# Patient Record
Sex: MALE | Race: WHITE | NOT HISPANIC OR LATINO | Employment: FULL TIME | URBAN - METROPOLITAN AREA
[De-identification: names, ages, dates, MRNs, and addresses within clinical notes are randomized per-mention and may not be internally consistent; named-entity substitution may affect disease eponyms.]

---

## 2017-01-03 ENCOUNTER — GENERIC CONVERSION - ENCOUNTER (OUTPATIENT)
Dept: OTHER | Facility: OTHER | Age: 32
End: 2017-01-03

## 2017-02-21 ENCOUNTER — GENERIC CONVERSION - ENCOUNTER (OUTPATIENT)
Dept: OTHER | Facility: OTHER | Age: 32
End: 2017-02-21

## 2017-06-06 ENCOUNTER — GENERIC CONVERSION - ENCOUNTER (OUTPATIENT)
Dept: OTHER | Facility: OTHER | Age: 32
End: 2017-06-06

## 2017-12-27 ENCOUNTER — ALLSCRIPTS OFFICE VISIT (OUTPATIENT)
Dept: OTHER | Facility: OTHER | Age: 32
End: 2017-12-27

## 2017-12-28 NOTE — PROGRESS NOTES
Assessment   1  Acute otitis media, right (382 9) (H66 91)   2  Acute sinusitis (461 9) (J01 90)    Plan   Acute otitis media, right, Acute sinusitis, recurrence not specified, unspecified location    · Azithromycin 250 MG Oral Tablet; TAKE 2 TABLETS ON DAY 1 THEN TAKE 1    TABLET A DAY FOR 4 DAYS    Discussion/Summary      1  Right acute otitis media-recommend Zithromax Z-Jaskaran as directed  Patient also use supportive care with over-the-counter Advil as necessary  Follow-up in the next 4-5 days if symptoms are not improving  Acute sinusitis-treatment as above  Patient may also continue DayQuil as necessary  Chief Complaint   C/o- Fever last night of 101, headache,sore throat and right ear paint x 1 day  Pt states he took advil for pain felt some relief  declines the flu vaccine today  [facility] [place]      History of Present Illness   HPI: This is a 42-year-old gentleman that presents to the office with concerns over head congestion, sore throat, and right ear pain for the past few days  He has been feeling fatigued and run down  He has been taking some over-the-counter Advil with little relief  He has been around multiple sick contacts  Review of Systems        Constitutional: no fever,-- not feeling poorly-- and-- not feeling tired  ENT: no nosebleeds-- and-- no nasal discharge  Cardiovascular: no chest pain-- and-- no palpitations  Respiratory: no cough-- and-- no shortness of breath during exertion  Gastrointestinal: no nausea-- and-- no diarrhea  Musculoskeletal: no arthralgias-- and-- no myalgias  Neurological: no headache  Over the past 2 weeks, how often have you been bothered by the following problems? 1 ) Little interest or pleasure in doing things? Not at all       2 ) Feeling down, depressed or hopeless? Not at all       3 ) Trouble falling asleep or sleeping too much? Not at all       4 ) Feeling tired or having little energy?  Not at all       5 ) Poor appetite or overeating? Not at all       6 ) Feeling bad about yourself, or that you are a failure, or have let yourself or your family down? Not at all       7 ) Trouble concentrating on things, such as reading a newspaper or watching television? Not at all       8 ) Moving or speaking so slowly that other people could have noticed, or the opposite, moving or speaking faster than usual? Not at all  How difficult have these problems made it for you to do your work, take care of things at home, or get along with people? Not at all  Active Problems   1  ACL tear (844 2) (S83 519A)   2  Allergic rhinitis (477 9) (J30 9)   3  Chronic meniscal tear of knee, left (717 5) (M23 207)   4  Eustachian tube dysfunction (381 81) (H69 80)   5  Headache (784 0) (R51)   6  History of repair of anterior cruciate ligament of left knee (V45 89) (Z98 890)   7  Left anterior cruciate ligament tear (844 2) (S83 512A)   8  Left knee injury (959 7) (S89 92XA)   9  Left knee pain (719 46) (M25 562)   10  Paronychia of toe (681 11) (L03 039)   11  Seasickness (994 6) (T75 3XXA)   12  Tinea corporis (110 5) (B35 4)    Past Medical History   1  History of acute otitis media (V12 49) (Z86 69)  Active Problems And Past Medical History Reviewed: The active problems and past medical history were reviewed and updated today  Family History   Mother    1  No pertinent family history  Father    2  No pertinent family history    Social History    ·    · Never a smoker   · No alcohol use   · No drug use   · Occupation   ·     Surgical History   1  History of Primary Repair Of Knee Ligament Cruciate Anterior    Current Meds      The medication list was reviewed and updated today  Allergies   1   No Known Drug Allergies    Vitals    Recorded: [id number] 11:02AM   Temperature 97 4 F, Tympanic   Heart Rate 72, L Radial   Pulse Quality Normal, L Radial   Respiration Quality Normal   Respiration 16   Systolic 295, LUE, Sitting   Diastolic 76, LUE, Sitting   Height 5 ft 11 in   Weight 178 lb 3 2 oz   BMI Calculated 24 85   BSA Calculated 2 01     Physical Exam        Constitutional      General appearance: No acute distress, well appearing and well nourished  Eyes      Conjunctiva and lids: No swelling, erythema, or discharge  Pupils and irises: Equal, round and reactive to light  Ears, Nose, Mouth, and Throat      External inspection of ears and nose: Normal        Otoscopic examination: Abnormal  -- Right tympanic membrane is dull with clear fluid effusion  Nasal mucosa, septum, and turbinates: Abnormal  -- Turbinates are erythematous and edematous bilaterally  Oropharynx: Normal with no erythema, edema, exudate or lesions  Pulmonary      Respiratory effort: No increased work of breathing or signs of respiratory distress  Auscultation of lungs: Clear to auscultation, equal breath sounds bilaterally, no wheezes, no rales, no rhonci  Cardiovascular      Auscultation of heart: Normal rate and rhythm, normal S1 and S2, without murmurs  Examination of extremities for edema and/or varicosities: Normal        Abdomen      Abdomen: Non-tender, no masses  Liver and spleen: No hepatomegaly or splenomegaly  Lymphatic      Palpation of lymph nodes in neck: No lymphadenopathy  Musculoskeletal      Gait and station: Normal        Digits and nails: Normal without clubbing or cyanosis  Inspection/palpation of joints, bones, and muscles: Normal        Neurologic      Reflexes: 2+ and symmetric  Sensation: No sensory loss         Psychiatric      Orientation to person, place and time: Normal        Mood and affect: Normal           Signatures    Electronically signed by : Bal España, [facility]; Dec 27 2017 11:16AM EST                       (Author)     Electronically signed by : You Peoples DO; Dec 27 2017 12:30PM EST                       (Author)

## 2018-01-15 NOTE — RESULT NOTES
Message   I did call pt and left voicemail to call back    pt has ACL tear and medial meniscus tear as well    he need ot see ortho    I believe he has an appointment already      Verified Results  * MRI KNEE LEFT  WO CONTRAST [id number] 06:51AM Anabel Kennedy   TW Order Number: [id number]   Performing Comments: HX of torn ACL, internal derangement, rule out liganment injury   [facility] AUTH#  [id number], VALID  11-  /  12-   - Patient Instructions: To schedule this appointment, please contact Central Scheduling at [phone]  Test Name Result Flag Reference   MRI KNEE LEFT  WO CONTRAST (Report)     MRI LEFT KNEE     INDICATION: Pt was jumping on a trampoline 11/11/16 when left knee went out and he felt instant pain  C/O left knee pain, weakness and unstable  COMPARISON: Left knee MR from 10/21/2015  TECHNIQUE:  MR sequences were obtained of the left knee including: Localizer, axial T2 fat sat, coronal T1/T2 fat sat, sagittal PD/T2 fat sat  Images were acquired on a 1 5 Jenny unit  Gadolinium was not used  FINDINGS:     SUBCUTANEOUS TISSUES: Normal     JOINT EFFUSION: None  BAKER'S CYST: None  MENISCI: There is a horizontal undersurface tear of the medial meniscus at the junction of the body and posterior horn, with flipped meniscal fragment into the inferior aspect of the medial gutter (series 3 images 17 through 19 and series 6 images 8    through 10 )     Lateral meniscus is diminutive, consistent with prior partial meniscectomy  CRUCIATE LIGAMENTS: Compared to 10/21/2015, there has been a revision anterior cruciate ligament repair  There is recurrent high grade partial versus complete tear of the ACL graft, as evidenced by angulation in the mid substance of the (series 3 images   11 through 14 ) The posterior cruciate ligament is intact  EXTENSOR APPARATUS: Intact  COLLATERAL LIGAMENTS: Intact  ARTICULAR SURFACES: Intact       BONE MARROW: There is marrow edema in the anterior aspect of the lateral femoral condyle and posterior aspect of the lateral tibial plateau, consistent with bone contusions from pivot shift mechanism of ACL tear  MUSCULATURE: Intact  IMPRESSION:     Compared to 10/21/2015, there has been a revision anterior cruciate ligament repair   There is recurrent high grade partial versus complete tear of the ACL graft (series 3 images 11 through 14 )      There is a horizontal undersurface tear of the medial meniscus at the junction of the body and posterior horn, with flipped meniscal fragment into the inferior aspect of the medial gutter (series 3 images 17 through 19 and series 6 images 8 through 10 )       Workstation performed: [id number]     Signed by:   Dario Atwood MD   11/28/16

## 2018-01-22 VITALS
SYSTOLIC BLOOD PRESSURE: 122 MMHG | BODY MASS INDEX: 24.95 KG/M2 | DIASTOLIC BLOOD PRESSURE: 76 MMHG | HEIGHT: 71 IN | TEMPERATURE: 97.4 F | RESPIRATION RATE: 16 BRPM | HEART RATE: 72 BPM | WEIGHT: 178.2 LBS

## 2018-01-23 NOTE — MISCELLANEOUS
Message  Return to work or school:   Gin Garcia is under my professional care  He was seen in my office on 12/27/17       Please excuse from work 12/28/17  MEGAN PERSON PA-C  Signatures   Electronically signed by :  Buzz Marquis, ; Dec 27 2017 11:13AM EST                       (Author)

## 2018-03-08 ENCOUNTER — OFFICE VISIT (OUTPATIENT)
Dept: FAMILY MEDICINE CLINIC | Facility: CLINIC | Age: 33
End: 2018-03-08
Payer: COMMERCIAL

## 2018-03-08 VITALS
DIASTOLIC BLOOD PRESSURE: 60 MMHG | SYSTOLIC BLOOD PRESSURE: 102 MMHG | TEMPERATURE: 98 F | BODY MASS INDEX: 24.8 KG/M2 | WEIGHT: 177.8 LBS | HEART RATE: 84 BPM

## 2018-03-08 DIAGNOSIS — J06.9 VIRAL URI: Primary | ICD-10-CM

## 2018-03-08 PROCEDURE — 1036F TOBACCO NON-USER: CPT | Performed by: PHYSICIAN ASSISTANT

## 2018-03-08 PROCEDURE — 99213 OFFICE O/P EST LOW 20 MIN: CPT | Performed by: PHYSICIAN ASSISTANT

## 2018-03-08 NOTE — PATIENT INSTRUCTIONS
Problem List Items Addressed This Visit     Viral URI - Primary     Symptomatic treatment with increase in fluids and continuation of Mucinex  Numbing lozenges

## 2018-03-08 NOTE — PROGRESS NOTES
Assessment/Plan:    Viral URI  Symptomatic treatment with increase in fluids and continuation of Mucinex  Numbing lozenges  Diagnoses and all orders for this visit:    Viral URI          Subjective:   CC: c/o sore throat, ears pooping and some sinus pressure x 1 day  felipe     Patient ID: Tayo Leonardo is a 28 y o  male  Symptoms of ST since yesterday  Mucinex this am  No fevers  No N/V/D  Head congestion, ST, PND of L side  Patient did have strep in the past has tonsils not has not had a problem with this since then  He wanted to make sure in today to make sure he did not need an antibiotic at this time  The following portions of the patient's history were reviewed and updated as appropriate: allergies, current medications, past family history, past medical history, past social history, past surgical history and problem list     Review of Systems   Constitutional: Negative  HENT: Positive for postnasal drip and sore throat  Eyes: Negative  Respiratory: Negative  Cardiovascular: Negative  Gastrointestinal: Negative  Endocrine: Negative  Genitourinary: Negative  Musculoskeletal: Negative  Skin: Negative  Allergic/Immunologic: Negative  Neurological: Negative  Hematological: Negative  Psychiatric/Behavioral: Negative  Objective:      Vitals:    03/08/18 1425   BP: 102/60   BP Location: Left arm   Patient Position: Sitting   Pulse: 84   Temp: 98 °F (36 7 °C)   TempSrc: Tympanic   Weight: 80 6 kg (177 lb 12 8 oz)            Physical Exam   Constitutional: He is oriented to person, place, and time  He appears well-developed and well-nourished  No distress  HENT:   Head: Normocephalic and atraumatic  Tonsils absent mild to moderate posterior pharynx erythema without exudate  Eyes: Conjunctivae are normal  Right eye exhibits no discharge  Left eye exhibits no discharge  Neck: Carotid bruit is not present     Cardiovascular: Normal rate, regular rhythm and normal heart sounds  Exam reveals no gallop and no friction rub  No murmur heard  Pulmonary/Chest: Effort normal and breath sounds normal  No respiratory distress  He has no wheezes  He has no rales  Neurological: He is alert and oriented to person, place, and time  Skin: Skin is warm and dry  He is not diaphoretic  Psychiatric: He has a normal mood and affect  Judgment normal    Nursing note and vitals reviewed

## 2018-12-20 ENCOUNTER — CLINICAL SUPPORT (OUTPATIENT)
Dept: FAMILY MEDICINE CLINIC | Facility: CLINIC | Age: 33
End: 2018-12-20
Payer: COMMERCIAL

## 2018-12-20 DIAGNOSIS — Z23 NEED FOR TDAP VACCINATION: ICD-10-CM

## 2018-12-20 DIAGNOSIS — Z23 NEED FOR INFLUENZA VACCINATION: Primary | ICD-10-CM

## 2018-12-20 PROCEDURE — 90471 IMMUNIZATION ADMIN: CPT

## 2018-12-20 PROCEDURE — 90472 IMMUNIZATION ADMIN EACH ADD: CPT

## 2018-12-20 PROCEDURE — 90686 IIV4 VACC NO PRSV 0.5 ML IM: CPT

## 2018-12-20 PROCEDURE — 90715 TDAP VACCINE 7 YRS/> IM: CPT

## 2018-12-20 NOTE — PROGRESS NOTES
Pt requesting pertussis vaccine due to wife having a baby next month  Administered Adacel 0 5ml IM to (R) deltoid  Also administered flu vaccine (Quad) to (L) delt  Tolerated both injections without difficulty and left the office in no distress  --bb

## 2019-01-08 ENCOUNTER — OFFICE VISIT (OUTPATIENT)
Dept: FAMILY MEDICINE CLINIC | Facility: CLINIC | Age: 34
End: 2019-01-08
Payer: COMMERCIAL

## 2019-01-08 VITALS
BODY MASS INDEX: 24.71 KG/M2 | DIASTOLIC BLOOD PRESSURE: 68 MMHG | WEIGHT: 182.4 LBS | HEART RATE: 72 BPM | SYSTOLIC BLOOD PRESSURE: 116 MMHG | HEIGHT: 72 IN

## 2019-01-08 DIAGNOSIS — S33.5XXA LUMBAR SPRAIN, INITIAL ENCOUNTER: Primary | ICD-10-CM

## 2019-01-08 PROBLEM — J06.9 VIRAL URI: Status: RESOLVED | Noted: 2018-03-08 | Resolved: 2019-01-08

## 2019-01-08 PROCEDURE — 1036F TOBACCO NON-USER: CPT | Performed by: PHYSICIAN ASSISTANT

## 2019-01-08 PROCEDURE — 99214 OFFICE O/P EST MOD 30 MIN: CPT | Performed by: PHYSICIAN ASSISTANT

## 2019-01-08 PROCEDURE — 3008F BODY MASS INDEX DOCD: CPT | Performed by: PHYSICIAN ASSISTANT

## 2019-01-08 RX ORDER — METHOCARBAMOL 750 MG/1
750 TABLET, FILM COATED ORAL EVERY 8 HOURS SCHEDULED
Qty: 30 TABLET | Refills: 1 | Status: SHIPPED | OUTPATIENT
Start: 2019-01-08 | End: 2019-05-02

## 2019-01-08 RX ORDER — NABUMETONE 750 MG/1
750 TABLET, FILM COATED ORAL 2 TIMES DAILY
Qty: 30 TABLET | Refills: 1 | Status: SHIPPED | OUTPATIENT
Start: 2019-01-08 | End: 2019-05-02

## 2019-01-08 NOTE — LETTER
January 8, 2019     Patient: Veronica Gonzalez   YOB: 1985   Date of Visit: 1/8/2019       To Whom it May Concern:    Theo Plunkett is under my professional care  He was seen in my office on 1/8/2019 and diagnosed with acute L lumbar back sprain  He should be allowed to work from home as tolerated to have access to heat and ice and medications treatments  If you have any questions or concerns, please don't hesitate to call           Sincerely,          Rodrigue Canada PA-C        CC: No Recipients

## 2019-01-08 NOTE — ASSESSMENT & PLAN NOTE
Relafen 750 mg twice daily with meals and Robaxin 750 mg up to three times a day  Heat and Ice alternating

## 2019-01-08 NOTE — PATIENT INSTRUCTIONS
Problem List Items Addressed This Visit        Musculoskeletal and Integument    Lumbar back sprain - Primary     Relafen 750 mg twice daily with meals and Robaxin 750 mg up to three times a day  Heat and Ice alternating            Relevant Medications    methocarbamol (ROBAXIN) 750 mg tablet    nabumetone (RELAFEN) 750 mg tablet

## 2019-01-08 NOTE — PROGRESS NOTES
Assessment/Plan:    Lumbar back sprain  Relafen 750 mg twice daily with meals and Robaxin 750 mg up to three times a day  Heat and Ice alternating  Diagnoses and all orders for this visit:    Lumbar sprain, initial encounter  -     methocarbamol (ROBAXIN) 750 mg tablet; Take 1 tablet (750 mg total) by mouth every 8 (eight) hours for 14 days  -     nabumetone (RELAFEN) 750 mg tablet; Take 1 tablet (750 mg total) by mouth 2 (two) times a day for 14 days          Subjective: c/o back pain  Pain is located on the lower (L) side  Onset 1/6/19 when pt was lifting about 100 lbs  Using tylenol and ASA with minimal relief  --bb     Patient ID: Jennifer Juarez is a 35 y o  male  Sunday am lifting 100 pound object in brothers garage and immediately felt discomfort  Having consistent pain and had to lye for a full 24 hours afterward  Taking ASA and tylenol w/o help  Worse on L only  No L leg pain or weakness  No butt pain  The following portions of the patient's history were reviewed and updated as appropriate: allergies, current medications, past family history, past medical history, past social history, past surgical history and problem list     Review of Systems   Constitutional: Negative  HENT: Negative  Eyes: Negative  Respiratory: Negative  Cardiovascular: Negative  Gastrointestinal: Negative  Endocrine: Negative  Genitourinary: Negative  Musculoskeletal: Positive for back pain  Skin: Negative  Allergic/Immunologic: Negative  Neurological: Negative  Hematological: Negative  Psychiatric/Behavioral: Negative  Objective:      /68 (BP Location: Left arm, Patient Position: Sitting, Cuff Size: Standard)   Pulse 72   Ht 6' (1 829 m)   Wt 82 7 kg (182 lb 6 4 oz)   BMI 24 74 kg/m²          Physical Exam   Constitutional: He is oriented to person, place, and time  He appears well-developed and well-nourished  No distress     HENT:   Head: Normocephalic and atraumatic  Eyes: Conjunctivae are normal  Right eye exhibits no discharge  Left eye exhibits no discharge  Neck: Carotid bruit is not present  Cardiovascular: Normal rate  Pulmonary/Chest: Effort normal  No respiratory distress  Musculoskeletal:        Arms:  Left lumbar paravertebral musculature with obvious edema tenderness swelling and firmness  Negative sciatic notch tenderness mild spine discomfort without evidence of disc disruption  Deep tendon reflexes within normal limits and equal    Neurological: He is alert and oriented to person, place, and time  He has normal strength and normal reflexes  Skin: Skin is warm and dry  He is not diaphoretic  Psychiatric: He has a normal mood and affect  Judgment normal    Nursing note and vitals reviewed

## 2019-05-02 ENCOUNTER — OFFICE VISIT (OUTPATIENT)
Dept: FAMILY MEDICINE CLINIC | Facility: CLINIC | Age: 34
End: 2019-05-02
Payer: COMMERCIAL

## 2019-05-02 VITALS
HEIGHT: 72 IN | WEIGHT: 176.4 LBS | DIASTOLIC BLOOD PRESSURE: 60 MMHG | HEART RATE: 80 BPM | BODY MASS INDEX: 23.89 KG/M2 | SYSTOLIC BLOOD PRESSURE: 122 MMHG

## 2019-05-02 DIAGNOSIS — J30.9 ALLERGIC RHINITIS, UNSPECIFIED SEASONALITY, UNSPECIFIED TRIGGER: Primary | ICD-10-CM

## 2019-05-02 PROBLEM — S83.509A SPRAIN OF CRUCIATE LIGAMENT OF KNEE: Status: ACTIVE | Noted: 2019-05-02

## 2019-05-02 PROCEDURE — 1036F TOBACCO NON-USER: CPT | Performed by: PHYSICIAN ASSISTANT

## 2019-05-02 PROCEDURE — 99214 OFFICE O/P EST MOD 30 MIN: CPT | Performed by: PHYSICIAN ASSISTANT

## 2019-05-02 RX ORDER — CETIRIZINE HYDROCHLORIDE 10 MG/1
10 TABLET ORAL DAILY
COMMUNITY
End: 2019-12-17

## 2019-05-02 RX ORDER — AZELASTINE 1 MG/ML
SPRAY, METERED NASAL
Qty: 1 BOTTLE | Refills: 1 | Status: SHIPPED | OUTPATIENT
Start: 2019-05-02 | End: 2019-12-17

## 2019-05-30 ENCOUNTER — OFFICE VISIT (OUTPATIENT)
Dept: FAMILY MEDICINE CLINIC | Facility: CLINIC | Age: 34
End: 2019-05-30
Payer: COMMERCIAL

## 2019-05-30 VITALS
SYSTOLIC BLOOD PRESSURE: 110 MMHG | HEIGHT: 72 IN | WEIGHT: 179 LBS | BODY MASS INDEX: 24.24 KG/M2 | TEMPERATURE: 100.9 F | HEART RATE: 112 BPM | DIASTOLIC BLOOD PRESSURE: 70 MMHG

## 2019-05-30 DIAGNOSIS — B34.9 VIRAL ILLNESS: Primary | ICD-10-CM

## 2019-05-30 DIAGNOSIS — R50.9 FEVER, UNSPECIFIED FEVER CAUSE: ICD-10-CM

## 2019-05-30 LAB
SL AMB  POCT GLUCOSE, UA: NEGATIVE
SL AMB LEUKOCYTE ESTERASE,UA: NEGATIVE
SL AMB POCT BILIRUBIN,UA: NEGATIVE
SL AMB POCT BLOOD,UA: NEGATIVE
SL AMB POCT CLARITY,UA: CLEAR
SL AMB POCT COLOR,UA: YELLOW
SL AMB POCT KETONES,UA: NEGATIVE
SL AMB POCT NITRITE,UA: NEGATIVE
SL AMB POCT PH,UA: 7
SL AMB POCT SPECIFIC GRAVITY,UA: 1.01
SL AMB POCT URINE PROTEIN: NEGATIVE
SL AMB POCT UROBILINOGEN: 0.2

## 2019-05-30 PROCEDURE — 3008F BODY MASS INDEX DOCD: CPT | Performed by: PHYSICIAN ASSISTANT

## 2019-05-30 PROCEDURE — 99213 OFFICE O/P EST LOW 20 MIN: CPT | Performed by: PHYSICIAN ASSISTANT

## 2019-05-30 PROCEDURE — 81003 URINALYSIS AUTO W/O SCOPE: CPT | Performed by: PHYSICIAN ASSISTANT

## 2019-09-14 ENCOUNTER — OFFICE VISIT (OUTPATIENT)
Dept: FAMILY MEDICINE CLINIC | Facility: CLINIC | Age: 34
End: 2019-09-14
Payer: COMMERCIAL

## 2019-09-14 VITALS
HEART RATE: 100 BPM | SYSTOLIC BLOOD PRESSURE: 108 MMHG | BODY MASS INDEX: 25.9 KG/M2 | TEMPERATURE: 100.3 F | HEIGHT: 72 IN | WEIGHT: 191.2 LBS | DIASTOLIC BLOOD PRESSURE: 74 MMHG

## 2019-09-14 DIAGNOSIS — B34.9 VIRAL ILLNESS: Primary | ICD-10-CM

## 2019-09-14 PROCEDURE — 99213 OFFICE O/P EST LOW 20 MIN: CPT | Performed by: NURSE PRACTITIONER

## 2019-09-14 PROCEDURE — 3008F BODY MASS INDEX DOCD: CPT | Performed by: NURSE PRACTITIONER

## 2019-09-14 RX ORDER — GUAIFENESIN, PSEUDOEPHEDRINE HYDROCHLORIDE 600; 60 MG/1; MG/1
1 TABLET, EXTENDED RELEASE ORAL EVERY 12 HOURS
COMMUNITY
Start: 2019-09-14 | End: 2022-07-06

## 2019-09-14 NOTE — PROGRESS NOTES
Assessment and Plan:    Problem List Items Addressed This Visit        Other    Viral illness - Primary    Relevant Medications    pseudoephedrine-guaifenesin (MUCINEX D)  MG per tablet                 Diagnoses and all orders for this visit:    Viral illness  Comments:  discussed FLU swab and treatment  patient denies to complete  Orders:  -     pseudoephedrine-guaifenesin (MUCINEX D)  MG per tablet; Take 1 tablet by mouth every 12 (twelve) hours              Subjective:      Patient ID: Tess Nova is a 35 y o  male  CC:    Chief Complaint   Patient presents with    Fatigue     Pt states he feels alot of head pressure and pain in mid back  He had a fever on Wednesday and Thursday  He also states his stomach has been bothering him as well  kw    Fever    Generalized Body Aches       HPI:    Patient states his symptoms started Thursday  His daughter was sick monday with fatigue, fever, and was taken to the ER and Pediatrician stated it was viral she was better in two days  His symptoms have not improved  Fever   This is a new problem  The current episode started in the past 7 days (2 days ago )  The problem occurs daily  The problem has been gradually improving  Associated symptoms include abdominal pain, chills, congestion, fatigue, a fever (TMAX 102 thursday and friday), headaches, myalgias, nausea, a sore throat and weakness  Pertinent negatives include no coughing, diaphoresis, rash, swollen glands or vomiting  He has tried acetaminophen and NSAIDs for the symptoms  The treatment provided moderate relief  URI    This is a new problem  Episode onset: 2 days ago  The problem has been unchanged  The maximum temperature recorded prior to his arrival was 102 - 102 9 F  The fever has been present for 1 to 2 days  Associated symptoms include abdominal pain, congestion, headaches, nausea and a sore throat   Pertinent negatives include no coughing, diarrhea, ear pain, rash, rhinorrhea, sinus pain, sneezing, swollen glands or vomiting  He has tried NSAIDs and acetaminophen for the symptoms  The treatment provided mild relief  The following portions of the patient's history were reviewed and updated as appropriate: allergies, current medications, past family history, past medical history, past social history, past surgical history and problem list       Review of Systems   Constitutional: Positive for appetite change, chills, fatigue and fever (TMAX 102 thursday and friday)  Negative for diaphoresis  HENT: Positive for congestion, postnasal drip, sinus pressure and sore throat  Negative for ear pain, rhinorrhea, sinus pain, sneezing and trouble swallowing  Respiratory: Negative for cough, chest tightness and shortness of breath  Cardiovascular: Negative  Gastrointestinal: Positive for abdominal pain and nausea  Negative for diarrhea and vomiting  Musculoskeletal: Positive for myalgias  Skin: Negative for rash  Neurological: Positive for weakness, light-headedness and headaches  Data to review:        Objective:    Vitals:    09/14/19 1029   BP: 108/74   BP Location: Left arm   Pulse: 100   Temp: 100 3 °F (37 9 °C)   TempSrc: Tympanic   Weight: 86 7 kg (191 lb 3 2 oz)   Height: 6' (1 829 m)        Physical Exam   Constitutional: He is oriented to person, place, and time  He appears well-developed and well-nourished  He appears ill  HENT:   Head: Normocephalic and atraumatic  Right Ear: A middle ear effusion (mild mucoid clear ) is present  Left Ear: Tympanic membrane normal    Nose: Nose normal    Mouth/Throat: No posterior oropharyngeal edema or posterior oropharyngeal erythema  Eyes: Pupils are equal    Cardiovascular: Normal rate, regular rhythm, S1 normal and S2 normal    No murmur heard  Pulmonary/Chest: Effort normal and breath sounds normal  No respiratory distress  Lymphadenopathy:     He has no cervical adenopathy     Neurological: He is alert and oriented to person, place, and time  Psychiatric: He has a normal mood and affect   Thought content normal

## 2019-09-14 NOTE — PATIENT INSTRUCTIONS
Viral Syndrome   AMBULATORY CARE:   Viral syndrome  is a term used for general symptoms of a viral infection that has no clear cause  Viruses are spread easily from person to person through the air and on shared items  Common symptoms include the following:   · Fever and chills    · A runny or stuffy nose     · Cough, sore throat, or hoarseness     · Headache, or pain and pressure around your eyes     · Muscle aches and joint pain     · Shortness of breath or wheezing     · Abdominal pain, cramps, and diarrhea     · Nausea, vomiting, or loss of appetite  Call 911 for the following:   · You have a seizure  · You cannot be woken  · You have chest pain or trouble breathing  Seek care immediately if:   · You have a stiff neck, a bad headache, and sensitivity to light  · You feel weak, dizzy, or confused  · You stop urinating or urinate a lot less than normal      · You cough up blood or thick, yellow or green, mucus  · You have severe abdominal pain or your abdomen is larger than usual   Contact your healthcare provider if:   · Your symptoms do not get better with treatment, or get worse, after 3 days  · You have a rash or ear pain  · You have burning when you urinate  · You have questions or concerns about your condition or care  Treatment for viral syndrome  may include medicines to manage your symptoms  You may  need any of the following:  · Acetaminophen  decreases pain and fever  It is available without a doctor's order  Ask how much medicine to take and how often to take it  Follow directions  Acetaminophen can cause liver damage if not taken correctly  · NSAIDs , such as ibuprofen, help decrease swelling, pain, and fever  NSAIDs can cause stomach bleeding or kidney problems in certain people  If you take blood thinner medicine, always ask your healthcare provider if NSAIDs are safe for you  Always read the medicine label and follow directions      · Cold medicine  helps decrease swelling, control a cough, and relieve chest or nasal congestion  · Saline nasal spray  helps decrease nasal congestion  · Take your medicine as directed  Contact your healthcare provider if you think your medicine is not helping or if you have side effects  Tell him of her if you are allergic to any medicine  Keep a list of the medicines, vitamins, and herbs you take  Include the amounts, and when and why you take them  Bring the list or the pill bottles to follow-up visits  Carry your medicine list with you in case of an emergency  Manage your symptoms:   · Drink liquids as directed  to prevent dehydration  Ask how much liquid to drink each day and which liquids are best for you  Ask if you should drink an oral rehydration solution (ORS)  An ORS has the right amounts of water, salts, and sugar you need to replace body fluids  This may help prevent dehydration caused by vomiting or diarrhea  Do not drink liquids with caffeine  Drinks with caffeine can make dehydration worse  · Get plenty of rest  to help your body heal  Take naps throughout the day  Ask your healthcare provider when you can return to work and your normal activities  · Use a cool mist humidifier  to help you breathe easier if you have nasal or chest congestion  Ask your healthcare provider how to use a cool mist humidifier  · Eat honey or use cough drops  to help decrease throat discomfort  Ask your healthcare provider how much honey you should eat each day  Cough drops are available without a doctor's order  Follow directions for taking cough drops  · Do not smoke and stay away from others who smoke  Nicotine and other chemicals in cigarettes and cigars can cause lung damage  Smoking can also delay healing  Ask your healthcare provider for information if you currently smoke and need help to quit  E-cigarettes or smokeless tobacco still contain nicotine   Talk to your healthcare provider before you use these products  · Wash your hands frequently  to prevent the spread of germs to others  Use soap and water  Use gel hand  when soap and water are not available  Wash your hands after you use the bathroom, cough, or sneeze  Wash your hands before you prepare or eat food  Follow up with your healthcare provider as directed:  Write down your questions so you remember to ask them during your visits  © 2017 [facility] Information is for End User's use only and may not be sold, redistributed or otherwise used for commercial purposes  All illustrations and images included in CareNotes® are the copyrighted property of A D A M , Inc  or Doug Hsieh  The above information is an  only  It is not intended as medical advice for individual conditions or treatments  Talk to your doctor, nurse or pharmacist before following any medical regimen to see if it is safe and effective for you

## 2019-12-17 ENCOUNTER — OFFICE VISIT (OUTPATIENT)
Dept: FAMILY MEDICINE CLINIC | Facility: CLINIC | Age: 34
End: 2019-12-17
Payer: COMMERCIAL

## 2019-12-17 VITALS
WEIGHT: 186 LBS | BODY MASS INDEX: 25.19 KG/M2 | DIASTOLIC BLOOD PRESSURE: 64 MMHG | HEART RATE: 88 BPM | SYSTOLIC BLOOD PRESSURE: 104 MMHG | HEIGHT: 72 IN | TEMPERATURE: 98.1 F

## 2019-12-17 DIAGNOSIS — J01.41 ACUTE RECURRENT PANSINUSITIS: Primary | ICD-10-CM

## 2019-12-17 DIAGNOSIS — Z23 NEED FOR INFLUENZA VACCINATION: ICD-10-CM

## 2019-12-17 DIAGNOSIS — J30.9 ALLERGIC RHINITIS, UNSPECIFIED SEASONALITY, UNSPECIFIED TRIGGER: ICD-10-CM

## 2019-12-17 LAB — S PYO AG THROAT QL: NEGATIVE

## 2019-12-17 PROCEDURE — 90686 IIV4 VACC NO PRSV 0.5 ML IM: CPT | Performed by: FAMILY MEDICINE

## 2019-12-17 PROCEDURE — 99213 OFFICE O/P EST LOW 20 MIN: CPT | Performed by: FAMILY MEDICINE

## 2019-12-17 PROCEDURE — 90471 IMMUNIZATION ADMIN: CPT | Performed by: FAMILY MEDICINE

## 2019-12-17 PROCEDURE — 87880 STREP A ASSAY W/OPTIC: CPT | Performed by: FAMILY MEDICINE

## 2019-12-17 RX ORDER — SULFAMETHOXAZOLE AND TRIMETHOPRIM 800; 160 MG/1; MG/1
1 TABLET ORAL EVERY 12 HOURS SCHEDULED
Qty: 20 TABLET | Refills: 0 | Status: SHIPPED | OUTPATIENT
Start: 2019-12-17 | End: 2019-12-27

## 2019-12-17 RX ORDER — FLUTICASONE PROPIONATE 50 MCG
1 SPRAY, SUSPENSION (ML) NASAL DAILY
Qty: 1 BOTTLE | Refills: 2 | Status: SHIPPED | OUTPATIENT
Start: 2019-12-17 | End: 2022-07-06

## 2019-12-17 NOTE — ASSESSMENT & PLAN NOTE
Patient previously was on Zyrtec  Did not notice any difference with the symptoms  Recommend trial Flonase 2 sprays each nostril daily  He has been on other nasal steroids before  Consider Singulair as well  Alternate to [facility] is Michael FLORES

## 2019-12-17 NOTE — PROGRESS NOTES
Assessment and Plan:    Problem List Items Addressed This Visit     Allergic rhinitis     Patient previously was on Zyrtec  Did not notice any difference with the symptoms  Recommend trial Flonase 2 sprays each nostril daily  He has been on other nasal steroids before  Consider Singulair as well  Alternate to [facility] is Michael NS  Relevant Medications    fluticasone (FLONASE) 50 mcg/act nasal spray      Other Visit Diagnoses     Acute recurrent pansinusitis    -  Primary    bactrim ds, follow in 2 weeks  Flonase  Relevant Medications    sulfamethoxazole-trimethoprim (BACTRIM DS) 800-160 mg per tablet    fluticasone (FLONASE) 50 mcg/act nasal spray    Other Relevant Orders    POCT rapid strepA (Completed)                 Diagnoses and all orders for this visit:    Acute recurrent pansinusitis  Comments:  bactrim ds, follow in 2 weeks  Flonase  Orders:  -     POCT rapid strepA  -     sulfamethoxazole-trimethoprim (BACTRIM DS) 800-160 mg per tablet; Take 1 tablet by mouth every 12 (twelve) hours for 10 days  -     fluticasone (FLONASE) 50 mcg/act nasal spray; 1 spray into each nostril daily    Allergic rhinitis, unspecified seasonality, unspecified trigger  -     fluticasone (FLONASE) 50 mcg/act nasal spray; 1 spray into each nostril daily              Subjective:      Patient ID: Jake Castillo is a 29 y o  male  CC:    Chief Complaint   Patient presents with    Sore Throat     Onset 3 weeks  Went to Urgent Care on Thanksgiving  Taking Mucinex, Nyquil, Dayquil, Advil mjs       HPI:    1 week before thanksgiving, he had some ST  Went to Urgent care, and [facility]  Symptomatic treatment  He waited for 2 weeks, and ST is not gone  Nasal congestion, ST   Mucinex D, advil (high dose)  Throat spray, halls coug drops  Nyquil and dayquil  Has not improved  Some post nasal drip with this, no face or tooth pain  He did note some vertigo symptoms about last Thursday  Has been random    Some HA, and then felt like sea sick  The following portions of the patient's history were reviewed and updated as appropriate: allergies, current medications, past family history, past medical history, past social history, past surgical history and problem list       Review of Systems   Constitutional: Negative  HENT: Positive for congestion, postnasal drip, rhinorrhea, sore throat and trouble swallowing  Negative for dental problem, sinus pressure and sinus pain  Eyes: Negative  Respiratory: Negative for cough and shortness of breath  Cardiovascular: Negative  Gastrointestinal: Negative  Data to review:       Objective:    Vitals:    12/17/19 1505   BP: 104/64   Pulse: 88   Temp: 98 1 °F (36 7 °C)   Weight: 84 4 kg (186 lb)   Height: 6' (1 829 m)        Physical Exam   Constitutional: He appears well-developed and well-nourished  HENT:   Head: Normocephalic and atraumatic  Right Ear: Hearing, tympanic membrane and ear canal normal  No drainage, swelling or tenderness  No middle ear effusion  Left Ear: Hearing, tympanic membrane and ear canal normal  No drainage, swelling or tenderness  No middle ear effusion  Mouth/Throat: Uvula is midline and oropharynx is clear and moist  No oral lesions  No uvula swelling  No oropharyngeal exudate, posterior oropharyngeal edema, posterior oropharyngeal erythema or tonsillar abscesses  Tonsils are 0 on the right  Tonsils are 0 on the left  No tonsillar exudate  Neck: Normal range of motion  Neck supple  Cardiovascular: Normal rate, regular rhythm and normal heart sounds  Exam reveals no gallop and no friction rub  No murmur heard  Pulses:       Carotid pulses are 2+ on the right side, and 2+ on the left side  Pulmonary/Chest: Effort normal and breath sounds normal  No respiratory distress  He has no wheezes  He has no rales  Lymphadenopathy:     He has no cervical adenopathy  Nursing note and vitals reviewed          BMI Counseling: Body mass index is 25 23 kg/m²  The BMI is above normal  Nutrition recommendations include decreasing portion sizes, encouraging healthy choices of fruits and vegetables, decreasing fast food intake, consuming healthier snacks, limiting drinks that contain sugar, moderation in carbohydrate intake, increasing intake of lean protein, reducing intake of saturated and trans fat and reducing intake of cholesterol  Exercise recommendations include exercising 3-5 times per week  No pharmacotherapy was ordered

## 2021-04-02 DIAGNOSIS — Z23 ENCOUNTER FOR IMMUNIZATION: ICD-10-CM

## 2022-07-06 ENCOUNTER — OFFICE VISIT (OUTPATIENT)
Dept: FAMILY MEDICINE CLINIC | Facility: CLINIC | Age: 37
End: 2022-07-06
Payer: COMMERCIAL

## 2022-07-06 VITALS
WEIGHT: 200.2 LBS | SYSTOLIC BLOOD PRESSURE: 122 MMHG | BODY MASS INDEX: 27.12 KG/M2 | DIASTOLIC BLOOD PRESSURE: 68 MMHG | HEART RATE: 72 BPM | RESPIRATION RATE: 16 BRPM | HEIGHT: 72 IN

## 2022-07-06 DIAGNOSIS — M65.4 DE QUERVAIN'S TENOSYNOVITIS, RIGHT: Primary | ICD-10-CM

## 2022-07-06 DIAGNOSIS — M77.11 LATERAL EPICONDYLITIS OF RIGHT ELBOW: ICD-10-CM

## 2022-07-06 DIAGNOSIS — S33.5XXA LUMBAR SPRAIN, INITIAL ENCOUNTER: ICD-10-CM

## 2022-07-06 DIAGNOSIS — M54.16 LUMBAR RADICULOPATHY: ICD-10-CM

## 2022-07-06 PROBLEM — M77.10 LATERAL EPICONDYLITIS: Status: ACTIVE | Noted: 2022-07-06

## 2022-07-06 PROCEDURE — 3725F SCREEN DEPRESSION PERFORMED: CPT | Performed by: FAMILY MEDICINE

## 2022-07-06 PROCEDURE — 99214 OFFICE O/P EST MOD 30 MIN: CPT | Performed by: FAMILY MEDICINE

## 2022-07-06 NOTE — PATIENT INSTRUCTIONS
Problem List Items Addressed This Visit       De Quervain's tenosynovitis, right - Primary     Patient does appear to have de Quervain secondary to testing  Would recommend PT, hand surgeon  In the meantime, continue with high-dose ibuprofen  Relevant Orders    Ambulatory Referral to Hand Surgery    Ambulatory Referral to Physical Therapy    Lateral epicondylitis     Patient appears to have lateral epicondylitis  There is significant discomfort over that area  Resisted pronation and supination, however, did not cause any discomfort  Nonsteroidal anti-inflammatory, heat in the morning, ice in the evening  PT  Relevant Orders    Ambulatory Referral to Hand Surgery    Ambulatory Referral to Physical Therapy    Lumbar back sprain     Per discussion of lumbar radiculopathy, PT, ibuprofen  Relevant Orders    Ambulatory Referral to Physical Therapy    XR spine lumbar minimum 4 views non injury    Lumbar radiculopathy     Patient does have lumbar radiculopathy, especially on the left  There is quite a significant amount of discomfort with it  He did mention that depending on which bed he is in (sometimes he has to sleep in his child's bed), it is worse  Soft bed seems to be more problematic  Recommend ibuprofen, 800 mg 4 times a day  PT  Relevant Orders    Ambulatory Referral to Physical Therapy    XR spine lumbar minimum 4 views non injury            COVID 19 Instructions    Syruth Diaz was advised to limit contact with others to essential tasks such as getting food, medications, and medical care  Proper handwashing reviewed, and Hand sanitzer when washing is not available  If the patient develops symptoms of COVID 19, the patient should call the office as soon as possible  For [id number] Flu season, it is strongly recommended that Flu Vaccinations be obtained        Virtual Visits:  Singh: This works on smart phones (any phone with [employer] capability)  You should get a text message when the provider is ready to see you  Click on the link in the text message, and the call should start  You will need to type in your name, and allow camera and microphone access  This is HIPPA compliant, and secure  If you have not already done so, get immunized to COVID 19  We are committed to getting you vaccinated as soon as possible and will be closely following CDC and [facility]  guidelines as they are released and revised  Please refer to our COVID-19 vaccine webpage for the most up to date information on the vaccine and our distribution efforts  This site will also have the most up to date recommendations for COVID booster vaccine  Vivek chen    Call [phone] ([phone]), option 7    OUR NEW LOCATION:    [address], [address], [address], [address]  Fax: [phone]    Lab services and OB/GYN are at this location as well

## 2022-07-06 NOTE — ASSESSMENT & PLAN NOTE
Patient does have lumbar radiculopathy, especially on the left  There is quite a significant amount of discomfort with it  He did mention that depending on which bed he is in (sometimes he has to sleep in his child's bed), it is worse  Soft bed seems to be more problematic  Recommend ibuprofen, 800 mg 4 times a day    PT

## 2022-07-06 NOTE — ASSESSMENT & PLAN NOTE
Patient appears to have lateral epicondylitis  There is significant discomfort over that area  Resisted pronation and supination, however, did not cause any discomfort  Nonsteroidal anti-inflammatory, heat in the morning, ice in the evening    PT

## 2022-07-06 NOTE — ASSESSMENT & PLAN NOTE
Patient does appear to have de Quervain secondary to testing  Would recommend PT, hand surgeon  In the meantime, continue with high-dose ibuprofen

## 2022-07-11 ENCOUNTER — EVALUATION (OUTPATIENT)
Dept: PHYSICAL THERAPY | Facility: CLINIC | Age: 37
End: 2022-07-11
Payer: COMMERCIAL

## 2022-07-11 DIAGNOSIS — M65.4 DE QUERVAIN'S TENOSYNOVITIS, RIGHT: ICD-10-CM

## 2022-07-11 DIAGNOSIS — M77.11 LATERAL EPICONDYLITIS OF RIGHT ELBOW: ICD-10-CM

## 2022-07-11 DIAGNOSIS — S33.5XXA LUMBAR SPRAIN, INITIAL ENCOUNTER: ICD-10-CM

## 2022-07-11 DIAGNOSIS — M54.16 LUMBAR RADICULOPATHY: ICD-10-CM

## 2022-07-11 PROCEDURE — 97161 PT EVAL LOW COMPLEX 20 MIN: CPT | Performed by: PHYSICAL THERAPIST

## 2022-07-11 PROCEDURE — 97161 PT EVAL LOW COMPLEX 20 MIN: CPT

## 2022-07-11 NOTE — PROGRESS NOTES
PT Evaluation     Today's date: 2022  Patient name: Asad Officer  : 1985  MRN: [id number]  Referring provider: Zuhair Narayanan MD  Dx:   Encounter Diagnosis     ICD-10-CM    1  Julius Morales tenosynovitis, right  M65 4 Ambulatory Referral to Physical Therapy   2  Lateral epicondylitis of right elbow  M77 11 Ambulatory Referral to Physical Therapy   3  Lumbar sprain, initial encounter  S33  5XXA Ambulatory Referral to Physical Therapy   4  Lumbar radiculopathy  M54 16 Ambulatory Referral to Physical Therapy                  Assessment  Assessment details: Pt is a 39 y o  male presenting to PT with R hand/wrist and L LE radiating pain  PT findings include: limited ROM of R wrist extension, but WNL and symmetrical in other directions   strength is WNL without recreation of pain  Pt also with negative carpal tunnel special tests but recreation of pain with median nerve tension  Pt also presents with positive neural tension of L LE, specifically in distribution of peroneal nerve  Signs and symptoms are consistent with nerve involvement as primary generator of pain  Pt educated on posture, decreasing irritation to nerve; reviewed biomechanics, anatomy, POC and rehab course  Pt will benefit from skilled PT to address above impairments to return to PLOF with less restriction and to reach functional goals  Impairments: abnormal or restricted ROM, impaired physical strength, lacks appropriate home exercise program and pain with function    Symptom irritability: lowUnderstanding of Dx/Px/POC: good   Prognosis: good    Goals  1  Pt will demonstrate understanding of HEP and POC in order to improve compliance with course of rehab in 2 weeks  2  Pt will demonstrate awareness of appropriate posture with seated, standing and functional dynamic reaching activities in order to decrease excessive loads/pain with ADL's in 3 weeks     3  Pt will have negative median nerve tension to allow pt to return to PLOF by d/c    4  Pt will have 0/10 pain during full day indicating resolved lumbar radiculopathy by d/c  Plan  Patient would benefit from: skilled physical therapy  Planned modality interventions: low level laser therapy  Planned therapy interventions: manual therapy, neuromuscular re-education, patient education, postural training, strengthening, stretching, therapeutic activities, therapeutic exercise, home exercise program and functional ROM exercises  Frequency: 2x week  Duration in weeks: 8  Treatment plan discussed with: patient        Subjective Evaluation    History of Present Illness  Mechanism of injury: Pt referred by PCP for R  Wrist/hand pain as well as back pain  Pt reports that he has had R hand/wrist for few weeks duration  He states that when he extends his elbow and turn wrist certain way will feel numbness/burning in palm of R hand  In addition, pt with L back/LE radiating pain for about 1 month  Primary complaints of numbness/tingling in the hand with extension, pain in the elbow with lifting as well  Pain in the buttock that radiates down leg occurs while seated at work for short transient period of time    Pt without any trauma or mechanism of injury  He is an  and works at [employer] majority of day     Quality of life: good    Pain  Current pain ratin  At best pain ratin  At worst pain ratin  Location: R hand pain (palm), L LE  Quality: sharp, tight and pulling  Relieving factors: relaxation, rest and support    Patient Goals  Patient goals for therapy: decreased pain, increased motion, increased strength and independence with ADLs/IADLs           Objective     Flowsheet Rows    Flowsheet Row Most Recent Value   PT/OT G-Codes    Current Score 79   Projected Score 85      Strength:   #2: 150#, 120#    Wrist ROM:  Wrist flex: 85°/85°  Wrist Ext: 65°/74°    Special Tests:  ULTT median: negative  ULTT ulnar: negative  ULTT radial: negative  Carpal tunnel compression: negative  Phalens: negative  Reverse Phalens: negative  SLR: positive (peroneal nerve bias)    Lumbar Joint mobility:  CPA: hyper L3-L5 (pain locally)    Repeated/Sustained motions: status quo all directions    LE strength:  Hip flexion: 5/5; 5/5  Hip abduction: 5/5; 5/5  Hip ER: 5/5; 5/5  Hip Ext: 5/5; 5/5         Precautions: None      Manuals 7/11            Median nerve glide             Cervical side glide             Laser R carpal tunnel                          Neuro Re-Ed             PPT             Isometric abdominal crunch             Prone hip extension             Paloff Press                                                    Ther Ex             LAQ 20x HEP            Supine peroneal nerve glide 20x HEP            Cervical SB AROM 20x HEP            Median nerve floss  20x HEP            Bridge HEP 3x10            TB Row             TB Ext             Pec doorway stretch                          Ther Activity                                       Gait Training                                       Modalities

## 2022-07-19 ENCOUNTER — OFFICE VISIT (OUTPATIENT)
Dept: PHYSICAL THERAPY | Facility: CLINIC | Age: 37
End: 2022-07-19
Payer: COMMERCIAL

## 2022-07-19 DIAGNOSIS — M54.16 LUMBAR RADICULOPATHY: ICD-10-CM

## 2022-07-19 DIAGNOSIS — M77.11 LATERAL EPICONDYLITIS OF RIGHT ELBOW: ICD-10-CM

## 2022-07-19 DIAGNOSIS — S33.5XXA LUMBAR SPRAIN, INITIAL ENCOUNTER: ICD-10-CM

## 2022-07-19 DIAGNOSIS — M65.4 DE QUERVAIN'S TENOSYNOVITIS, RIGHT: Primary | ICD-10-CM

## 2022-07-19 PROCEDURE — 97110 THERAPEUTIC EXERCISES: CPT | Performed by: PHYSICAL THERAPIST

## 2022-07-19 PROCEDURE — 97110 THERAPEUTIC EXERCISES: CPT

## 2022-07-19 PROCEDURE — 97112 NEUROMUSCULAR REEDUCATION: CPT | Performed by: PHYSICAL THERAPIST

## 2022-07-19 PROCEDURE — 97140 MANUAL THERAPY 1/> REGIONS: CPT

## 2022-07-19 PROCEDURE — 97112 NEUROMUSCULAR REEDUCATION: CPT

## 2022-07-19 PROCEDURE — 97140 MANUAL THERAPY 1/> REGIONS: CPT | Performed by: PHYSICAL THERAPIST

## 2022-07-19 NOTE — PROGRESS NOTES
Daily Note     Today's date: 2022  Patient name: Shonna Kim  : 1985  MRN: [id number]  Referring provider: Rodri Rae MD  Dx:   Encounter Diagnosis     ICD-10-CM    1  Elizabeth Mccormick tenosynovitis, right  M65 4    2  Lateral epicondylitis of right elbow  M77 11    3  Lumbar sprain, initial encounter  S33  5XXA    4  Lumbar radiculopathy  M54 16                   Subjective: Pt reports that his back has not been bothering him, no incidences of pain, therefore feeling exercises are helping him  He states that wrist is "hit or miss"  States that it feels improved, but not sure whether it is from ibuprofen or exercises  Objective: See treatment diary below  Repeated cervical extensions: reduction in symptoms     Assessment: Pt with good tolerance to treatment today with no complaints of pain or symptom recreation  Pt improves with repeated cervical extensions, therefore added to HEP  PT will monitor pt's symptoms and per presentation NV, will modify POC  Pt will benefit from continued skilled PT  Plan: Continue per plan of care  Precautions: None      Manuals            Median nerve glide             Cervical side glide  R glide DL           Laser R carpal tunnel  DL 4'            Cervical CPA  Supine Gr II-III DL           Neuro Re-Ed             PPT  15x5"           Isometric abdominal crunch  15x5"           Prone hip extension  2x10 ea  Paloff Press  Double black 10x5" ea  Ther Ex             LAQ 20x HEP            Supine peroneal nerve glide 20x HEP            Cervical SB AROM 20x HEP            Median nerve floss  20x HEP            Bridge HEP 3x10            Shrug             Prone Periscap lift  T, Ext 10x5" ea              TB Row  Blue 20x           TB Ext  Grn 20x           Pec doorway stretch  20"x3           Thoracic foam protocol   NV           UBE retro  3'           Ther Activity Gait Training                                       Modalities

## 2022-07-25 ENCOUNTER — APPOINTMENT (OUTPATIENT)
Dept: RADIOLOGY | Facility: CLINIC | Age: 37
End: 2022-07-25
Payer: COMMERCIAL

## 2022-07-25 ENCOUNTER — OFFICE VISIT (OUTPATIENT)
Dept: OBGYN CLINIC | Facility: CLINIC | Age: 37
End: 2022-07-25
Payer: COMMERCIAL

## 2022-07-25 VITALS
DIASTOLIC BLOOD PRESSURE: 76 MMHG | BODY MASS INDEX: 27.09 KG/M2 | SYSTOLIC BLOOD PRESSURE: 118 MMHG | HEIGHT: 72 IN | WEIGHT: 200 LBS

## 2022-07-25 DIAGNOSIS — R20.0 NUMBNESS OF RIGHT HAND: ICD-10-CM

## 2022-07-25 DIAGNOSIS — G56.01 ACUTE CARPAL TUNNEL SYNDROME OF RIGHT WRIST: ICD-10-CM

## 2022-07-25 DIAGNOSIS — M25.531 RIGHT WRIST PAIN: Primary | ICD-10-CM

## 2022-07-25 DIAGNOSIS — M25.531 RIGHT WRIST PAIN: ICD-10-CM

## 2022-07-25 PROCEDURE — 73110 X-RAY EXAM OF WRIST: CPT

## 2022-07-25 PROCEDURE — 99203 OFFICE O/P NEW LOW 30 MIN: CPT | Performed by: PHYSICIAN ASSISTANT

## 2022-07-25 NOTE — PATIENT INSTRUCTIONS
Carpal Tunnel Syndrome Exercises   WHAT YOU NEED TO KNOW:   What do I need to know about carpal tunnel syndrome (CTS) exercises? CTS exercises can help relieve pain and increase your range of motion  Your healthcare provider or physical therapist can tell you how often to do the exercises  What exercises can I do? Finger extensions:  Hold your fingers and thumb close together  Keep them straight  Put a rubber band around the outside of your fingers and thumb  Spread your fingers apart  Then slowly bring them together without letting the rubber band fall off  Repeat 40 times  Wrist flexor stretch:  Hold your arm out straight  Grasp your fingers with your other hand  Slowly bend the fingers back (palm facing away) until you feel a stretch in your wrist  Hold for 10 seconds  Repeat 5 times  Wrist extensor stretch:  Hold your arm out straight  Grasp your fingers with your other hand  Slowly bend the fingers and hand down (palm facing you) until you feel a stretch on top of your hand  Hold for 10 seconds  Repeat 5 times  Wrist curls without weights:  Sit in a chair with your forearm resting on your thigh or a table  With your palm facing down, flex your wrist up 3 inches and slowly lower it  Turn your forearm over and repeat with your palm facing up  Do each exercise 20 times  Shrugs:  Stand with your arms by your side  Lift your shoulders up to your ears and hold for 1 second  Then pull your shoulders back, pinching your shoulder blades together  Hold for 1 second  Then relax your shoulders  Repeat 20 times  CARE AGREEMENT:   You have the right to help plan your care  Learn about your health condition and how it may be treated  Discuss treatment options with your healthcare providers to decide what care you want to receive  You always have the right to refuse treatment  The above information is an  only   It is not intended as medical advice for individual conditions or treatments  Talk to your doctor, nurse or pharmacist before following any medical regimen to see if it is safe and effective for you  © Copyright [facility] 2022 Information is for End User's use only and may not be sold, redistributed or otherwise used for commercial purposes   All illustrations and images included in CareNotes® are the copyrighted property of A D A M , Inc  or [address] [place] [address]

## 2022-08-01 ENCOUNTER — OFFICE VISIT (OUTPATIENT)
Dept: PHYSICAL THERAPY | Facility: CLINIC | Age: 37
End: 2022-08-01
Payer: COMMERCIAL

## 2022-08-01 DIAGNOSIS — M54.16 LUMBAR RADICULOPATHY: ICD-10-CM

## 2022-08-01 DIAGNOSIS — S33.5XXA LUMBAR SPRAIN, INITIAL ENCOUNTER: ICD-10-CM

## 2022-08-01 DIAGNOSIS — M65.4 DE QUERVAIN'S TENOSYNOVITIS, RIGHT: Primary | ICD-10-CM

## 2022-08-01 DIAGNOSIS — M77.11 LATERAL EPICONDYLITIS OF RIGHT ELBOW: ICD-10-CM

## 2022-08-01 PROCEDURE — 97110 THERAPEUTIC EXERCISES: CPT

## 2022-08-01 PROCEDURE — 97112 NEUROMUSCULAR REEDUCATION: CPT

## 2022-08-01 PROCEDURE — 97140 MANUAL THERAPY 1/> REGIONS: CPT

## 2022-08-01 NOTE — PROGRESS NOTES
Daily Note     Today's date: 2022  Patient name: Reyes Bazan  : 1985  MRN: [id number]  Referring provider: Mj Boykin MD  Dx:   Encounter Diagnosis     ICD-10-CM    1  Luann Beckham tenosynovitis, right  M65 4    2  Lateral epicondylitis of right elbow  M77 11    3  Lumbar sprain, initial encounter  S33  5XXA    4  Lumbar radiculopathy  M54 16                   Subjective: Pt reports his back has been feeling good  Notes occasional pain R wrist/hand  Objective: See treatment diary below    Assessment: Performed exercise program w/o difficulty or discomfort  Responded well to manual therapies  Comfortable during laser  Issued GTB, blue TB and updated written HEP instructions, reviewed same w/pt  Will monitor  Pt would benefit from cont PT>    Plan:  Cont per POC  Precautions: None      Manuals           Median nerve glide             Cervical side glide  R glide DL MD          Laser R carpal tunnel  DL 4'  4'          Cervical CPA  Supine Gr II-III DL MD          Neuro Re-Ed             PPT  15x5" 15x5"          Isometric abdominal crunch  15x5" 20x5"          Prone hip extension  2x10 ea  2x10 ea          Paloff Press  Double black 10x5" ea  double blk  10x5" ea                                                 Ther Ex             LAQ 20x HEP            Supine peroneal nerve glide 20x HEP            Cervical SB AROM 20x HEP            Median nerve floss  20x HEP            Bridge HEP 3x10            Shrug             Prone Periscap lift  T, Ext 10x5" ea    T,ext  10x5"          TB Row  Blue 20x Blue  20x          TB Ext  Grn 20x grn  20x          Pec doorway stretch  20"x3 20"x3          Thoracic foam protocol   NV 10x ea          UBE retro  3' 3'          Ther Activity                                       Gait Training                                       Modalities

## 2022-08-08 ENCOUNTER — APPOINTMENT (OUTPATIENT)
Dept: PHYSICAL THERAPY | Facility: CLINIC | Age: 37
End: 2022-08-08
Payer: COMMERCIAL

## 2022-08-15 ENCOUNTER — OFFICE VISIT (OUTPATIENT)
Dept: PHYSICAL THERAPY | Facility: CLINIC | Age: 37
End: 2022-08-15
Payer: COMMERCIAL

## 2022-08-15 DIAGNOSIS — M65.4 DE QUERVAIN'S TENOSYNOVITIS, RIGHT: Primary | ICD-10-CM

## 2022-08-15 DIAGNOSIS — M77.11 LATERAL EPICONDYLITIS OF RIGHT ELBOW: ICD-10-CM

## 2022-08-15 DIAGNOSIS — M54.16 LUMBAR RADICULOPATHY: ICD-10-CM

## 2022-08-15 PROCEDURE — 97140 MANUAL THERAPY 1/> REGIONS: CPT | Performed by: PHYSICAL THERAPIST

## 2022-08-15 PROCEDURE — 97112 NEUROMUSCULAR REEDUCATION: CPT | Performed by: PHYSICAL THERAPIST

## 2022-08-15 PROCEDURE — 97110 THERAPEUTIC EXERCISES: CPT | Performed by: PHYSICAL THERAPIST

## 2022-08-15 NOTE — PROGRESS NOTES
Daily Note     Today's date: 8/15/2022  Patient name: Nicola Diallo  : 1985  MRN: [id number]  Referring provider: Elly Bueno MD  Dx:   Encounter Diagnosis     ICD-10-CM    1  Adair Bowels tenosynovitis, right  M65 4    2  Lateral epicondylitis of right elbow  M77 11    3  Lumbar radiculopathy  M54 16                   Subjective: Pt reports that he feels his is moving in the right direction  Reports that he is no longer experience LE radicular symptoms  Reports that he continues to have pain into his R wrist/hand but that it is less intense and that "I can't activate it like I used to"  Reports compliance with his current hep  Objective: See treatment diary below    Assessment: added therex and progressed as listed  Pt requires occasional verbal cues to avoid UT substitution during postural therex but this iimproves with cues  Plan:  Cont per POC  Precautions: None      Manuals 7/11 7/19 8/1 8/15         Median nerve glide             Cervical side glide  R glide DL MD montoya         Laser R carpal tunnel  DL 4'  4' 4'         Cervical CPA  Supine Gr II-III DL MD montoya         Neuro Re-Ed             PPT  15x5" 15x5" hep         Isometric abdominal crunch  15x5" 20x5" hep         Prone hip extension  2x10 ea  2x10 ea x20es         Paloff Press  Double black 10x5" ea  double blk  10x5" ea Black 5"x10                                                Ther Ex             LAQ 20x HEP            Supine peroneal nerve glide 20x HEP            Cervical SB AROM 20x HEP            Median nerve floss  20x HEP            Bridge HEP 3x10            Shrug             Prone Periscap lift  T, Ext 10x5" ea    T,ext  10x5" 5"x10 2#         TB Row  Blue 20x Blue  20x Black 5"x20         TB Ext  Grn 20x grn  20x Black 5"x20         Pec doorway stretch  20"x3 20"x3 20"x3         Thoracic foam protocol   NV 10x ea x10ea         UBE retro  3' 3' 3'         Thoracic ext with pect stretch    Seated 1/2 roll 10"x10 scap retraction with tb ER    Blue tb 5"x20         Ther Activity                                       Gait Training                                       Modalities

## 2022-08-22 ENCOUNTER — OFFICE VISIT (OUTPATIENT)
Dept: PHYSICAL THERAPY | Facility: CLINIC | Age: 37
End: 2022-08-22
Payer: COMMERCIAL

## 2022-08-22 DIAGNOSIS — M54.16 LUMBAR RADICULOPATHY: ICD-10-CM

## 2022-08-22 DIAGNOSIS — M77.11 LATERAL EPICONDYLITIS OF RIGHT ELBOW: ICD-10-CM

## 2022-08-22 DIAGNOSIS — S33.5XXA LUMBAR SPRAIN, INITIAL ENCOUNTER: ICD-10-CM

## 2022-08-22 DIAGNOSIS — M65.4 DE QUERVAIN'S TENOSYNOVITIS, RIGHT: Primary | ICD-10-CM

## 2022-08-22 PROCEDURE — 97110 THERAPEUTIC EXERCISES: CPT

## 2022-08-22 PROCEDURE — 97140 MANUAL THERAPY 1/> REGIONS: CPT

## 2022-08-22 PROCEDURE — 97112 NEUROMUSCULAR REEDUCATION: CPT

## 2022-08-22 NOTE — PROGRESS NOTES
Daily Note     Today's date: 2022  Patient name: Colleen Barry  : 1985  MRN: [id number]  Referring provider: Jaspreet Del Valle MD  Dx:   Encounter Diagnosis     ICD-10-CM    1  Sheilda Apgar tenosynovitis, right  M65 4    2  Lateral epicondylitis of right elbow  M77 11    3  Lumbar radiculopathy  M54 16    4  Lumbar sprain, initial encounter  S33  5XXA                   Subjective: Pt reports everything is feeling better  Objective: See treatment diary below    Assessment: Performed exercise program w/o difficulty or discomfort  Responded well to manual therapies  Will monitor  Plan:  Cont per POC  Precautions: None      Manuals 7/11 7/19 8/1 8/15 8/22        Median nerve glide             Cervical side glide  R glide DL MD montoya KG        Laser R carpal tunnel  DL 4'  4' 4' 4'        Cervical CPA  Supine Gr II-III DL MD montoya KG        Neuro Re-Ed             PPT  15x5" 15x5" hep         Isometric abdominal crunch  15x5" 20x5" hep         Prone hip extension  2x10 ea  2x10 ea x20es x20 ea        Paloff Press  Double black 10x5" ea  double blk  10x5" ea Black 5"x10 Black  5"x10                                               Ther Ex             LAQ 20x HEP            Supine peroneal nerve glide 20x HEP            Cervical SB AROM 20x HEP            Median nerve floss  20x HEP            Bridge HEP 3x10            Shrug             Prone Periscap lift  T, Ext 10x5" ea    T,ext  10x5" 5"x10 2# 5"x10   Ea 2#        TB Row  Blue 20x Blue  20x Black 5"x20 Black  5"x20        TB Ext  Grn 20x grn  20x Black 5"x20 Black  5"x20        Pec doorway stretch  20"x3 20"x3 [id number]        Thoracic foam protocol   NV 10x ea x10ea x10ea        UBE retro  3' 3' 3' 4'        Thoracic ext with pect stretch    Seated 1/2 roll 10"x10 Seated  1/2 roll  10"x10        scap retraction with tb ER    Blue tb 5"x20 Blue tb  5"x20        Ther Activity                                       Gait Training Modalities

## 2022-08-29 ENCOUNTER — OFFICE VISIT (OUTPATIENT)
Dept: PHYSICAL THERAPY | Facility: CLINIC | Age: 37
End: 2022-08-29
Payer: COMMERCIAL

## 2022-08-29 DIAGNOSIS — M54.16 LUMBAR RADICULOPATHY: ICD-10-CM

## 2022-08-29 DIAGNOSIS — M65.4 DE QUERVAIN'S TENOSYNOVITIS, RIGHT: Primary | ICD-10-CM

## 2022-08-29 DIAGNOSIS — S33.5XXA LUMBAR SPRAIN, INITIAL ENCOUNTER: ICD-10-CM

## 2022-08-29 DIAGNOSIS — M77.11 LATERAL EPICONDYLITIS OF RIGHT ELBOW: ICD-10-CM

## 2022-08-29 PROCEDURE — 97140 MANUAL THERAPY 1/> REGIONS: CPT

## 2022-08-29 PROCEDURE — 97110 THERAPEUTIC EXERCISES: CPT

## 2022-08-29 PROCEDURE — 97112 NEUROMUSCULAR REEDUCATION: CPT

## 2022-08-29 NOTE — PROGRESS NOTES
Daily Note     Today's date: 2022  Patient name: Loi Badillo  : 1985  MRN: [id number]  Referring provider: Sofia Shin MD  Dx:   Encounter Diagnosis     ICD-10-CM    1  Rexie Tye tenosynovitis, right  M65 4    2  Lateral epicondylitis of right elbow  M77 11    3  Lumbar radiculopathy  M54 16    4  Lumbar sprain, initial encounter  S33  5XXA                   Subjective: Pt reports his R wrist/hand, and LB are feeling better  Objective: See treatment diary below    Assessment: Performed exercise progressions w/o issue  Responded well to manual therapies  Will monitor  Plan: Cont per POC  Precautions: None      Manuals 7/11 7/19 8/1 8/15 8/22 8/29       Median nerve glide             Cervical side glide  R glide DL MD kl KG SG       Laser R carpal tunnel  DL 4'  4' 4' 4' 4'       Cervical CPA  Supine Gr II-III DL MD kl KG SG       Neuro Re-Ed             PPT  15x5" 15x5" hep         Isometric abdominal crunch  15x5" 20x5" hep         Prone hip extension  2x10 ea  2x10 ea x20es x20 ea x20 ea       Paloff Press  Double black 10x5" ea  double blk  10x5" ea Black 5"x10 Black  5"x10 Black  5"x10                                              Ther Ex             LAQ 20x HEP            Supine peroneal nerve glide 20x HEP            Cervical SB AROM 20x HEP            Median nerve floss  20x HEP            Bridge HEP 3x10            Shrug             Prone Periscap lift  T, Ext 10x5" ea    T,ext  10x5" 5"x10 2# 5"x10   Ea 2# 5"x10  Ea 2#       TB Row  Blue 20x Blue  20x Black 5"x20 Black  5"x20 Black  5"x20       TB Ext  Grn 20x grn  20x Black 5"x20 Black  5"x20 Black  5"x20       Pec doorway stretch  20"x3 20"x3 20"x3 20"x3 20"x3       Thoracic foam protocol   NV 10x ea x10ea x10ea x15 ea       UBE retro  3' 3' 3' 4' 4'       Thoracic ext with pect stretch    Seated 1/2 roll 10"x10 Seated  1/2 roll  10"x10 Seated  1/2 roll  10"x10       scap retraction with tb ER    Blue tb 5"x20 Blue tb  5"x20 Blue tb  5"x20       Ther Activity                                       Gait Training                                       Modalities                                          Treatment performed by Lisha Woods, ANOOP, I attest that this treatment was directly supervised by YAMILEX XavierT

## 2022-09-06 ENCOUNTER — APPOINTMENT (OUTPATIENT)
Dept: PHYSICAL THERAPY | Facility: CLINIC | Age: 37
End: 2022-09-06
Payer: COMMERCIAL

## 2022-09-07 ENCOUNTER — HOSPITAL ENCOUNTER (OUTPATIENT)
Dept: ULTRASOUND IMAGING | Facility: HOSPITAL | Age: 37
Discharge: HOME | End: 2022-09-07
Payer: COMMERCIAL

## 2022-09-07 DIAGNOSIS — G56.01 ACUTE CARPAL TUNNEL SYNDROME OF RIGHT WRIST: ICD-10-CM

## 2022-09-07 DIAGNOSIS — R20.0 NUMBNESS OF RIGHT HAND: ICD-10-CM

## 2022-09-07 DIAGNOSIS — M25.531 RIGHT WRIST PAIN: ICD-10-CM

## 2022-09-07 PROCEDURE — 76882 US LMTD JT/FCL EVL NVASC XTR: CPT

## 2022-09-13 ENCOUNTER — OFFICE VISIT (OUTPATIENT)
Dept: PHYSICAL THERAPY | Facility: CLINIC | Age: 37
End: 2022-09-13
Payer: COMMERCIAL

## 2022-09-13 DIAGNOSIS — M77.11 LATERAL EPICONDYLITIS OF RIGHT ELBOW: ICD-10-CM

## 2022-09-13 DIAGNOSIS — M54.16 LUMBAR RADICULOPATHY: Primary | ICD-10-CM

## 2022-09-13 DIAGNOSIS — M65.4 DE QUERVAIN'S TENOSYNOVITIS, RIGHT: ICD-10-CM

## 2022-09-13 PROCEDURE — 97110 THERAPEUTIC EXERCISES: CPT | Performed by: PHYSICAL THERAPIST

## 2022-09-13 PROCEDURE — 97140 MANUAL THERAPY 1/> REGIONS: CPT | Performed by: PHYSICAL THERAPIST

## 2022-09-13 NOTE — PROGRESS NOTES
Daily Note     Today's date: 2022  Patient name: Davonna Litten  : 1985  MRN: [id number]  Referring provider: Adam Malin MD  Dx:   Encounter Diagnosis     ICD-10-CM    1  Lumbar radiculopathy  M54 16    2  Lateral epicondylitis of right elbow  M77 11    3  De Quervain's tenosynovitis, right  M65 4                   Subjective: Pt reports that hand is continuing to be better  He states that he has a consult with hand surgeon   He reports that he has agitated back from lifting something this weekend but overall improvement throughout  Objective: See treatment diary below      Assessment: Pt at this time with functional improvements throughout, pt to consult with hand surgeon and will be on hold from PT pending consult  Pt educated to continue with maintenance of exercises to keep gains and prevent future injury  Plan: Hold from PT, will keep episode open 30 days and d/c if no f/u  Precautions: None      Manuals 7/11 7/19 8/1 8/15 8/22 8/29 9/13      Median nerve glide             Cervical side glide  R glide DL MD kl KG SG DL      Laser R carpal tunnel  DL 4'  4' 4' 4' 4' Laser R cerv  4'      Cervical CPA  Supine Gr II-III DL MD kl KG SG DL      Neuro Re-Ed             PPT  15x5" 15x5" hep         Isometric abdominal crunch  15x5" 20x5" hep         Prone hip extension  2x10 ea  2x10 ea x20es x20 ea x20 ea x20 ea  Paloff Press  Double black 10x5" ea  double blk  10x5" ea Black 5"x10 Black  5"x10 Black  5"x10 manual resist supine PPT, 2x30"                                             Ther Ex             LAQ 20x HEP            Supine peroneal nerve glide 20x HEP            Cervical SB AROM 20x HEP            Median nerve floss  20x HEP            Bridge HEP 3x10            Shrug             Prone Periscap lift  T, Ext 10x5" ea    T,ext  10x5" 5"x10 2# 5"x10   Ea 2# 5"x10  Ea 2# 5"x10  Ea 2#      TB Row  Blue 20x Blue  20x Black 5"x20 Black  5"x20 Black  5"x20 Black 3x10      TB Ext  Grn 20x grn  20x Black 5"x20 Black  5"x20 Black  5"x20 Black 3x10      Pec doorway stretch  20"x3 20"x3 20"x3 20"x3 20"x3 20"x3      Thoracic foam protocol   NV 10x ea x10ea x10ea x15 ea       UBE retro  3' 3' 3' 4' 4' 4'      Thoracic ext with pect stretch    Seated 1/2 roll 10"x10 Seated  1/2 roll  10"x10 Seated  1/2 roll  10"x10 Seated 1/2 roll 10"x10      scap retraction with tb ER    Blue tb 5"x20 Blue tb  5"x20 Blue tb  5"x20       Tband W       Grn 2x10 5"      Ther Activity                                       Gait Training                                       Modalities

## 2022-09-19 ENCOUNTER — OFFICE VISIT (OUTPATIENT)
Dept: OBGYN CLINIC | Facility: CLINIC | Age: 37
End: 2022-09-19
Payer: COMMERCIAL

## 2022-09-19 VITALS
HEIGHT: 72 IN | BODY MASS INDEX: 26.82 KG/M2 | DIASTOLIC BLOOD PRESSURE: 80 MMHG | WEIGHT: 198 LBS | SYSTOLIC BLOOD PRESSURE: 126 MMHG

## 2022-09-19 DIAGNOSIS — M79.2 NEURITIS: Primary | ICD-10-CM

## 2022-09-19 PROCEDURE — 99214 OFFICE O/P EST MOD 30 MIN: CPT | Performed by: ORTHOPAEDIC SURGERY

## 2022-09-19 NOTE — PROGRESS NOTES
ASSESSMENT/PLAN:    Assessment:   Right [place]ez cutaneous branch of median nerve numbness (neuritis)     Plan:   Patient may transition out of OT at this time  He can wear the splint until he is pain free  He was advised if this is still problematic int he next few months to give our office a call  He expressed understanding  Follow Up:  PRN    _____________________________________________________  CHIEF COMPLAINT:  Chief Complaint   Patient presents with    Right Wrist - Pain     U/S obtained 9/7/22    Right Hand - Numbness         SUBJECTIVE:  Dariel Heimlich is a 39 y o  male who presents with Numbness to the right hand  This started  5 month(s) ago as Sudden  Radiation: Yes to the  palm of hand  Previous Treatments: NSAIDs, therapy, activity modification and bracing with relief He still reports minimal numbness to the palm of his hand  Associated symptoms: No Complaints  Handedness: right    PAST MEDICAL HISTORY:  History reviewed  No pertinent past medical history  PAST SURGICAL HISTORY:  Past Surgical History:   Procedure Laterality Date    ANTERIOR CRUCIATE LIGAMENT REPAIR         FAMILY HISTORY:  Family History   Problem Relation Age of Onset    No Known Problems Mother     No Known Problems Father        SOCIAL HISTORY:  Social History     Tobacco Use    Smoking status: Never Smoker    Smokeless tobacco: Never Used       MEDICATIONS:  No current outpatient medications on file  ALLERGIES:  No Known Allergies    REVIEW OF SYSTEMS:  Pertinent items are noted in HPI      LABS:  HgA1c: No results found for: HGBA1C  BMP: No results found for: GLUCOSE, CALCIUM, NA, K, CO2, CL, BUN, CREATININE      _____________________________________________________  PHYSICAL EXAMINATION:  Vital signs: /80   Ht 6' (1 829 m)   Wt 89 8 kg (198 lb)   BMI 26 85 kg/m²   General: well developed and well nourished, alert, oriented times 3 and appears comfortable  Psychiatric: Normal  HEENT: Trachea Midline, No torticollis  Cardiovascular: No discernable arrhythmia  Pulmonary: No wheezing or stridor  Abdomen: No rebound or guarding  Extremities: No peripheral edema  Skin: No masses, erythema, lacerations, fluctation, ulcerations  Neurovascular: Motor Intact to the Median, Ulnar, Radial Nerve and Pulses Intact    MUSCULOSKELETAL EXAMINATION:  RIGHT SIDE:  Carpal tunnel:  No weakness APB, No atrophy thenar muscles and Negative tinel's test  deltoid 5/5, biceps 5/5, triceps 5/5, wrist flexion 5/5, wrist extension 5/5, full elbow and wrist ROM, AIN 5/5, intrinsic 5/5, no ttp over lacertus fibrosis, fds of long finger 5/5    _____________________________________________________  STUDIES REVIEWED:  Images were reviewed in PACS by Dr Thedora Spatz and demonstrate: US of right wrist on 9/7/2022 demonstrates no evidence of CTS         PROCEDURES PERFORMED:  Procedures  No Procedures performed today   Scribe Attestation    I,:  Roz Levy am acting as a scribe while in the presence of the attending physician :       I,:  Alcira Munoz MD personally performed the services described in this documentation    as scribed in my presence :

## 2022-10-18 NOTE — PROGRESS NOTES
It has been >30 days since last PT visit with no follow up  Pt to be discharged from skilled PT at this time

## 2023-01-13 ENCOUNTER — OFFICE VISIT (OUTPATIENT)
Dept: URGENT CARE | Facility: CLINIC | Age: 38
End: 2023-01-13

## 2023-01-13 VITALS
OXYGEN SATURATION: 97 % | TEMPERATURE: 96.7 F | HEIGHT: 72 IN | WEIGHT: 197 LBS | HEART RATE: 84 BPM | BODY MASS INDEX: 26.68 KG/M2 | RESPIRATION RATE: 18 BRPM

## 2023-01-13 DIAGNOSIS — J01.00 ACUTE MAXILLARY SINUSITIS, RECURRENCE NOT SPECIFIED: Primary | ICD-10-CM

## 2023-01-13 DIAGNOSIS — J40 BRONCHITIS: ICD-10-CM

## 2023-01-13 RX ORDER — AZITHROMYCIN 250 MG/1
TABLET, FILM COATED ORAL
Qty: 6 TABLET | Refills: 0 | Status: SHIPPED | OUTPATIENT
Start: 2023-01-13 | End: 2023-01-17

## 2023-01-13 NOTE — PROGRESS NOTES
[facility]        NAME: Deysi John is a 40 y o  male  : 1985    MRN: [id number]  DATE: 2023  TIME: 5:06 PM    Pulse 84   Temp (!) 96 7 °F (35 9 °C)   Resp 18   Ht 6' (1 829 m)   Wt 89 4 kg (197 lb)   SpO2 97%   BMI 26 72 kg/m²     Assessment and Plan   Acute maxillary sinusitis, recurrence not specified [J01 00]  1  Acute maxillary sinusitis, recurrence not specified  azithromycin (ZITHROMAX) 250 mg tablet      2  Bronchitis  azithromycin (ZITHROMAX) 250 mg tablet            Patient Instructions       Follow up with PCP in 3-5 days  Proceed to  ER if symptoms worsen  Chief Complaint     Chief Complaint   Patient presents with   • Cough     Pt presents with coughing with brown thick sputum, sore throat, sinus pressure, headache, post nasal drip x 4 days  Pt does not want covid testing today  History of Present Illness       Pt with productive cough and congestion for about 4-5 days       Review of Systems   Review of Systems   Constitutional: Negative  HENT: Positive for congestion, sinus pressure and sinus pain  Eyes: Negative  Respiratory: Positive for cough  Cardiovascular: Negative  Gastrointestinal: Negative  Endocrine: Negative  Genitourinary: Negative  Musculoskeletal: Negative  Skin: Negative  Allergic/Immunologic: Negative  Neurological: Negative  Hematological: Negative  Psychiatric/Behavioral: Negative  All other systems reviewed and are negative          Current Medications       Current Outpatient Medications:   •  azithromycin (ZITHROMAX) 250 mg tablet, Take 2 tablets today then 1 tablet daily x 4 days, Disp: 6 tablet, Rfl: 0    Current Allergies     Allergies as of 2023   • (No Known Allergies)            The following portions of the patient's history were reviewed and updated as appropriate: allergies, current medications, past family history, past medical history, past social history, past surgical history and problem list      History reviewed  No pertinent past medical history  Past Surgical History:   Procedure Laterality Date   • ANTERIOR CRUCIATE LIGAMENT REPAIR         Family History   Problem Relation Age of Onset   • No Known Problems Mother    • No Known Problems Father          Medications have been verified  Objective   Pulse 84   Temp (!) 96 7 °F (35 9 °C)   Resp 18   Ht 6' (1 829 m)   Wt 89 4 kg (197 lb)   SpO2 97%   BMI 26 72 kg/m²        Physical Exam     Physical Exam  Vitals and nursing note reviewed  Constitutional:       Appearance: Normal appearance  He is normal weight  HENT:      Head: Normocephalic and atraumatic  Right Ear: Tympanic membrane, ear canal and external ear normal       Left Ear: Tympanic membrane, ear canal and external ear normal       Nose: Congestion and rhinorrhea present  Comments: Boggy mucosa  Max sinus tender to palp      Mouth/Throat:      Mouth: Mucous membranes are moist       Pharynx: Oropharynx is clear  Eyes:      Extraocular Movements: Extraocular movements intact  Conjunctiva/sclera: Conjunctivae normal       Pupils: Pupils are equal, round, and reactive to light  Cardiovascular:      Rate and Rhythm: Normal rate and regular rhythm  Pulses: Normal pulses  Heart sounds: Normal heart sounds  Pulmonary:      Effort: Pulmonary effort is normal       Comments: Minor coarse sounds   Abdominal:      General: Abdomen is flat  Bowel sounds are normal       Palpations: Abdomen is soft  Musculoskeletal:         General: Normal range of motion  Cervical back: Normal range of motion and neck supple  Skin:     General: Skin is warm  Neurological:      General: No focal deficit present  Mental Status: He is alert and oriented to person, place, and time     Psychiatric:         Mood and Affect: Mood normal          Behavior: Behavior normal

## 2023-01-19 ENCOUNTER — OFFICE VISIT (OUTPATIENT)
Dept: FAMILY MEDICINE CLINIC | Facility: CLINIC | Age: 38
End: 2023-01-19

## 2023-01-19 VITALS
WEIGHT: 202 LBS | HEART RATE: 106 BPM | BODY MASS INDEX: 27.36 KG/M2 | SYSTOLIC BLOOD PRESSURE: 110 MMHG | OXYGEN SATURATION: 98 % | DIASTOLIC BLOOD PRESSURE: 82 MMHG | HEIGHT: 72 IN

## 2023-01-19 DIAGNOSIS — H65.03 NON-RECURRENT ACUTE SEROUS OTITIS MEDIA OF BOTH EARS: ICD-10-CM

## 2023-01-19 DIAGNOSIS — Z03.818 ENCOUNTER FOR OBSERVATION FOR SUSPECTED EXPOSURE TO OTHER BIOLOGICAL AGENTS RULED OUT: Primary | ICD-10-CM

## 2023-01-19 RX ORDER — CEFUROXIME AXETIL 500 MG/1
500 TABLET ORAL EVERY 12 HOURS SCHEDULED
Qty: 20 TABLET | Refills: 0 | Status: SHIPPED | OUTPATIENT
Start: 2023-01-19 | End: 2023-01-20 | Stop reason: ALTCHOICE

## 2023-01-19 NOTE — PROGRESS NOTES
Name: Vallerie Lesch      : 1985      MRN: [id number]  Encounter Provider: Jennifer Way MD  Encounter Date: 2023   Encounter department: [place] [address]  Encounter for observation for suspected exposure to other biological agents ruled out  Comments:  Cough starting over a week ago  Improved with Z natalio, but symptoms restarted  Rec Ceftin  Check for COVID as well  Orders:  -     Covid/Flu- Office Collect    2  Non-recurrent acute serous otitis media of both ears  -     cefuroxime (CEFTIN) 500 mg tablet; Take 1 tablet (500 mg total) by mouth every 12 (twelve) hours for 10 days         Subjective      Chief Complaint   Patient presents with   • Earache     Mostly right ear pain, started about 1 5 week ago  • Sinus Problem     pressure       Patient is a 41 y/o male presenting with sinus pressure and ear pain  Started over a week ago as a wet cough with phlegm and sore throat, then progressed to sinus pressure  Daughter was sick with similar sx who was managed with Motrin  Went to the urgent care last Fri and was diagnosed with bronchitis and was given a Z-pack  Was never swabed for COVID or Flu  After the first day of ABX the sore throat resolved immediately and the cough became a drier cough  He finished the Z-pack on Tues of this week  Late yesterday he has had more congestion and started having right ear pain, which he thinks might be an ear infection  Review of Systems   Constitutional: Positive for fatigue  Negative for appetite change and fever  HENT: Positive for congestion, ear pain, postnasal drip, rhinorrhea, sinus pressure and sore throat ( resolved w/ Z pack)  Eyes: Negative for discharge and itching  Respiratory: Positive for cough ( wet cough)  Negative for chest tightness and shortness of breath  Cardiovascular: Negative for chest pain and palpitations     Gastrointestinal: Negative for abdominal pain, diarrhea, nausea and vomiting  Musculoskeletal: Negative  Skin: Negative  Neurological: Positive for headaches  Negative for dizziness, syncope, weakness and light-headedness  No current outpatient medications on file prior to visit  Objective     /82 (BP Location: Left arm, Patient Position: Sitting, Cuff Size: Standard)   Pulse (!) 106   Ht 6' (1 829 m)   Wt 91 6 kg (202 lb)   SpO2 98%   BMI 27 40 kg/m²     Physical Exam  Constitutional:       Appearance: Normal appearance  He is well-developed, well-groomed and normal weight  HENT:      Head: Normocephalic and atraumatic  Right Ear: Ear canal and external ear normal  Tympanic membrane is erythematous  Left Ear: Tympanic membrane, ear canal and external ear normal       Nose: Congestion present  Mouth/Throat:      Mouth: Mucous membranes are moist       Pharynx: Oropharynx is clear  Cardiovascular:      Rate and Rhythm: Normal rate and regular rhythm  Pulses: Normal pulses  Heart sounds: Normal heart sounds  Pulmonary:      Effort: Pulmonary effort is normal       Breath sounds: Normal breath sounds and air entry  Musculoskeletal:         General: Normal range of motion  Cervical back: Normal range of motion and neck supple  Skin:     General: Skin is warm and dry  Neurological:      General: No focal deficit present  Mental Status: He is alert and oriented to person, place, and time  Psychiatric:         Mood and Affect: Mood normal          Behavior: Behavior normal  Behavior is cooperative  Thought Content:  Thought content normal          Judgment: Judgment normal        Jennifer Way MD

## 2023-01-19 NOTE — PATIENT INSTRUCTIONS
1  Encounter for observation for suspected exposure to other biological agents ruled out  Comments:  Cough starting over a week ago  Improved with Z natalio, but symptoms restarted  Rec Ceftin  Check for COVID as well  Orders:  -     Covid/Flu- Office Collect    2  Non-recurrent acute serous otitis media of both ears  -     cefuroxime (CEFTIN) 500 mg tablet; Take 1 tablet (500 mg total) by mouth every 12 (twelve) hours for 10 days        COVID 19 Instructions    Rajeev Wallis was advised to limit contact with others to essential tasks such as getting food, medications, and medical care  Proper handwashing reviewed, and Hand sanitzer when washing is not available  If the patient develops symptoms of COVID 19, the patient should call the office as soon as possible  For 2064-6262 Flu season, it is strongly recommended that Flu Vaccinations be obtained  Virtual Visits:  Singh: This works on smart phones (any phone with Internet browsing capability)  You should get a text message when the provider is ready to see you  Click on the link in the text message, and the call should start  You will need to type in your name, and allow camera and microphone access  This is HIPPA compliant, and secure  If you have not already done so, get immunized to COVID 19  We are committed to getting you vaccinated as soon as possible and will be closely following CDC and [facility]  guidelines as they are released and revised  Please refer to our COVID-19 vaccine webpage for the most up to date information on the vaccine and our distribution efforts  This site will also have the most up to date recommendations for COVID booster vaccine      Vivek chen    Call [phone] ([phone]), option 7    OUR NEW LOCATION:    [address], [address] [place], [place] [address]way [address], [place], [address]  Fax: [phone]    Lab services and OB/GYN are at this location as well

## 2023-01-19 NOTE — PROGRESS NOTES
Name: Maurice Rojas      : 1985      MRN: [id number]  Encounter Provider: Rogelio Coats MD  Encounter Date: 2023   Encounter department: [place] [address]     1  Encounter for observation for suspected exposure to other biological agents ruled out  Comments:  Cough starting over a week ago  Improved with Z natalio, but symptoms restarted  Rec Ceftin  Check for COVID as well  Orders:  -     Covid/Flu- Office Collect    2  Non-recurrent acute serous otitis media of both ears  -     cefuroxime (CEFTIN) 500 mg tablet; Take 1 tablet (500 mg total) by mouth every 12 (twelve) hours for 10 days           Subjective      HPI  Review of Systems    No current outpatient medications on file prior to visit         Objective     /82 (BP Location: Left arm, Patient Position: Sitting, Cuff Size: Standard)   Pulse (!) 106   Ht 6' (1 829 m)   Wt 91 6 kg (202 lb)   SpO2 98%   BMI 27 40 kg/m²     Physical Exam  Rogelio Coats MD

## 2023-01-20 ENCOUNTER — OFFICE VISIT (OUTPATIENT)
Dept: FAMILY MEDICINE CLINIC | Facility: CLINIC | Age: 38
End: 2023-01-20

## 2023-01-20 VITALS
TEMPERATURE: 97.4 F | BODY MASS INDEX: 27.09 KG/M2 | HEIGHT: 72 IN | DIASTOLIC BLOOD PRESSURE: 84 MMHG | WEIGHT: 200 LBS | SYSTOLIC BLOOD PRESSURE: 112 MMHG | HEART RATE: 106 BPM

## 2023-01-20 DIAGNOSIS — J01.00 ACUTE NON-RECURRENT MAXILLARY SINUSITIS: Primary | ICD-10-CM

## 2023-01-20 LAB
FLUAV RNA RESP QL NAA+PROBE: NEGATIVE
FLUBV RNA RESP QL NAA+PROBE: NEGATIVE
SARS-COV-2 RNA RESP QL NAA+PROBE: NEGATIVE

## 2023-01-20 RX ORDER — AMOXICILLIN AND CLAVULANATE POTASSIUM 875; 125 MG/1; MG/1
1 TABLET, FILM COATED ORAL EVERY 12 HOURS SCHEDULED
Qty: 20 TABLET | Refills: 0 | Status: SHIPPED | OUTPATIENT
Start: 2023-01-20 | End: 2023-01-30

## 2023-01-20 NOTE — PROGRESS NOTES
Name: Erendira Stacy      : 1985      MRN: [id number]  Encounter Provider: Chi Suero MD  Encounter Date: 2023   Encounter department: [place] [address]  Acute non-recurrent maxillary sinusitis  Comments:  Increasing pain in the left eye, and behind the eye  Possible dental abscess versus sinus  CT sinuses stat, change from Ceftin to Augmentin  Orders:  -     CT sinus wo contrast; Future; Expected date: 2023  -     amoxicillin-clavulanate (AUGMENTIN) 875-125 mg per tablet; Take 1 tablet by mouth every 12 (twelve) hours for 10 days         Subjective      Left sinus pressure and pain  Is behind the eye  Also pain in the upper tooth on the left  Worse when blows nose  Started on Ceftin, but feels worse  Review of Systems   Constitutional: Negative  HENT: Positive for dental problem, postnasal drip, rhinorrhea, sinus pressure and sinus pain  Respiratory: Negative          Current Outpatient Medications on File Prior to Visit   Medication Sig   • [DISCONTINUED] cefuroxime (CEFTIN) 500 mg tablet Take 1 tablet (500 mg total) by mouth every 12 (twelve) hours for 10 days       Objective     /84 (BP Location: Right arm, Patient Position: Sitting, Cuff Size: Adult)   Pulse (!) 106   Temp (!) 97 4 °F (36 3 °C) (Temporal)   Ht 6' (1 829 m) Comment: on record  Wt 90 7 kg (200 lb)   BMI 27 12 kg/m²     Physical Exam  Chi Suero MD

## 2023-07-10 ENCOUNTER — APPOINTMENT (OUTPATIENT)
Dept: LAB | Facility: CLINIC | Age: 38
End: 2023-07-10
Payer: COMMERCIAL

## 2023-07-10 ENCOUNTER — OFFICE VISIT (OUTPATIENT)
Dept: FAMILY MEDICINE CLINIC | Facility: CLINIC | Age: 38
End: 2023-07-10
Payer: COMMERCIAL

## 2023-07-10 VITALS
HEART RATE: 72 BPM | WEIGHT: 198 LBS | HEIGHT: 72 IN | RESPIRATION RATE: 20 BRPM | OXYGEN SATURATION: 97 % | DIASTOLIC BLOOD PRESSURE: 78 MMHG | SYSTOLIC BLOOD PRESSURE: 118 MMHG | BODY MASS INDEX: 26.82 KG/M2

## 2023-07-10 DIAGNOSIS — L42 PITYRIASIS ROSEA: ICD-10-CM

## 2023-07-10 DIAGNOSIS — L42 PITYRIASIS ROSEA: Primary | ICD-10-CM

## 2023-07-10 DIAGNOSIS — R21 RASH: ICD-10-CM

## 2023-07-10 PROBLEM — B34.9 VIRAL ILLNESS: Status: RESOLVED | Noted: 2019-05-30 | Resolved: 2023-07-10

## 2023-07-10 LAB
B BURGDOR IGG+IGM SER-ACNC: <0.2 AI
BASOPHILS # BLD AUTO: 0.04 THOUSANDS/ÂΜL (ref 0–0.1)
BASOPHILS NFR BLD AUTO: 1 % (ref 0–1)
CRP SERPL QL: <3 MG/L
EOSINOPHIL # BLD AUTO: 0.1 THOUSAND/ÂΜL (ref 0–0.61)
EOSINOPHIL NFR BLD AUTO: 2 % (ref 0–6)
ERYTHROCYTE [DISTWIDTH] IN BLOOD BY AUTOMATED COUNT: 12.8 % (ref 11.6–15.1)
HCT VFR BLD AUTO: 44.6 % (ref 36.5–49.3)
HGB BLD-MCNC: 15 G/DL (ref 12–17)
IMM GRANULOCYTES # BLD AUTO: 0.02 THOUSAND/UL (ref 0–0.2)
IMM GRANULOCYTES NFR BLD AUTO: 0 % (ref 0–2)
LYMPHOCYTES # BLD AUTO: 1.68 THOUSANDS/ÂΜL (ref 0.6–4.47)
LYMPHOCYTES NFR BLD AUTO: 36 % (ref 14–44)
MCH RBC QN AUTO: 30.5 PG (ref 26.8–34.3)
MCHC RBC AUTO-ENTMCNC: 33.6 G/DL (ref 31.4–37.4)
MCV RBC AUTO: 91 FL (ref 82–98)
MONOCYTES # BLD AUTO: 0.36 THOUSAND/ÂΜL (ref 0.17–1.22)
MONOCYTES NFR BLD AUTO: 8 % (ref 4–12)
NEUTROPHILS # BLD AUTO: 2.43 THOUSANDS/ÂΜL (ref 1.85–7.62)
NEUTS SEG NFR BLD AUTO: 53 % (ref 43–75)
NRBC BLD AUTO-RTO: 0 /100 WBCS
PLATELET # BLD AUTO: 186 THOUSANDS/UL (ref 149–390)
PMV BLD AUTO: 11.3 FL (ref 8.9–12.7)
RBC # BLD AUTO: 4.92 MILLION/UL (ref 3.88–5.62)
WBC # BLD AUTO: 4.63 THOUSAND/UL (ref 4.31–10.16)

## 2023-07-10 PROCEDURE — 36415 COLL VENOUS BLD VENIPUNCTURE: CPT

## 2023-07-10 PROCEDURE — 86140 C-REACTIVE PROTEIN: CPT

## 2023-07-10 PROCEDURE — 86618 LYME DISEASE ANTIBODY: CPT

## 2023-07-10 PROCEDURE — 99213 OFFICE O/P EST LOW 20 MIN: CPT | Performed by: FAMILY MEDICINE

## 2023-07-10 PROCEDURE — 85025 COMPLETE CBC W/AUTO DIFF WBC: CPT

## 2023-07-10 RX ORDER — PREDNISONE 10 MG/1
TABLET ORAL
Qty: 30 TABLET | Refills: 0 | Status: SHIPPED | OUTPATIENT
Start: 2023-07-10

## 2023-07-10 NOTE — PATIENT INSTRUCTIONS
Weight Management   AMBULATORY CARE:   Why it is important to manage your weight:  Being overweight increases your risk of health conditions such as heart disease, high blood pressure, type 2 diabetes, and certain types of cancer. It can also increase your risk for osteoarthritis, sleep apnea, and other respiratory problems. Aim for a slow, steady weight loss. Even a small amount of weight loss can lower your risk of health problems. Risks of being overweight:  Extra weight can cause many health problems, including the following:  Diabetes (high blood sugar level)    High blood pressure or high cholesterol    Heart disease    Stroke    Gallbladder or liver disease    Cancer of the colon, breast, prostate, liver, or kidney    Sleep apnea    Arthritis or gout    Screening  is done to check for health conditions before you have signs or symptoms. If you are 28to 79years old, your blood sugar level may be checked every 3 years for signs of prediabetes or diabetes. Your healthcare provider will check your blood pressure at each visit. High blood pressure can lead to a stroke or other problems. Your provider may check for signs of heart disease, cancer, or other health problems. How to lose weight safely:  A safe and healthy way to lose weight is to eat fewer calories and get regular exercise. You can lose up about 1 pound a week by decreasing the number of calories you eat by 500 calories each day. You can decrease calories by eating smaller portion sizes or by cutting out high-calorie foods. Read labels to find out how many calories are in the foods you eat. You can also burn calories with exercise such as walking, swimming, or biking. You will be more likely to keep weight off if you make these changes part of your lifestyle. Exercise at least 30 minutes per day on most days of the week.  You can also fit in more physical activity by taking the stairs instead of the elevator or parking farther away from stores. Ask your healthcare provider about the best exercise plan for you. Healthy meal plan for weight management:  A healthy meal plan includes a variety of foods, contains fewer calories, and helps you stay healthy. A healthy meal plan includes the following:     Eat whole-grain foods more often. A healthy meal plan should contain fiber. Fiber is the part of grains, fruits, and vegetables that is not broken down by your body. Whole-grain foods are healthy and provide extra fiber in your diet. Some examples of whole-grain foods are whole-wheat breads and pastas, oatmeal, brown rice, and bulgur. Eat a variety of vegetables every day. Include dark, leafy greens such as spinach, kale, felicity greens, and mustard greens. Eat yellow and orange vegetables such as carrots, sweet potatoes, and winter squash. Eat a variety of fruits every day. Choose fresh or canned fruit (canned in its own juice or light syrup) instead of juice. Fruit juice has very little or no fiber. Eat low-fat dairy foods. Drink fat-free (skim) milk or 1% milk. Eat fat-free yogurt and low-fat cottage cheese. Try low-fat cheeses such as mozzarella and other reduced-fat cheeses. Choose meat and other protein foods that are low in fat. Choose beans or other legumes such as split peas or lentils. Choose fish, skinless poultry (chicken or turkey), or lean cuts of red meat (beef or pork). Before you cook meat or poultry, cut off any visible fat. Use less fat and oil. Try baking foods instead of frying them. Add less fat, such as margarine, sour cream, regular salad dressing and mayonnaise to foods. Eat fewer high-fat foods. Some examples of high-fat foods include french fries, doughnuts, ice cream, and cakes. Eat fewer sweets. Limit foods and drinks that are high in sugar. This includes candy, cookies, regular soda, and sweetened drinks. Ways to decrease calories:   Eat smaller portions.      Use a small plate with smaller servings. Do not eat second helpings. When you eat at a restaurant, ask for a box and place half of your meal in the box before you eat. Share an entrée with someone else. Replace high-calorie snacks with healthy, low-calorie snacks. Choose fresh fruit, vegetables, fat-free rice cakes, or air-popped popcorn instead of potato chips, nuts, or chocolate. Choose water or calorie-free drinks instead of soda or sweetened drinks. Do not shop for groceries when you are hungry. You may be more likely to make unhealthy food choices. Take a grocery list of healthy foods and shop after you have eaten. Eat regular meals. Do not skip meals. Skipping meals can lead to overeating later in the day. This can make it harder for you to lose weight. Eat a healthy snack in place of a meal if you do not have time to eat a regular meal. Talk with a dietitian to help you create a meal plan and schedule that is right for you. Other things to consider as you try to lose weight:   Be aware of situations that may give you the urge to overeat, such as eating while watching television. Find ways to avoid these situations. For example, read a book, go for a walk, or do crafts. Meet with a weight loss support group or friends who are also trying to lose weight. This may help you stay motivated to continue working on your weight loss goals. © Copyright [employer] 2022 Information is for End User's use only and may not be sold, redistributed or otherwise used for commercial purposes. The above information is an  only. It is not intended as medical advice for individual conditions or treatments. Talk to your doctor, nurse or pharmacist before following any medical regimen to see if it is safe and effective for you. Pityriasis rosea   WHAT YOU NEED TO KNOW:   What is Pityriasis rosea? Pityriasis rosea is a skin disorder that causes a scaly rash. The cause of Pityriasis rosea is not known.  It usually goes away on its own in 2 to 12 weeks. Pityriasis rosea most often occurs in people who are 8to 28years old and during pregnancy. What are the signs and symptoms of Pityriasis rosea? The rash first appears as a single pink patch on the chest or back. In people who have dark skin, the color may be violet or dark gray. Within 90 days of the first patch, the rash spreads to the rest of the torso. The rash can also spread to the neck, arms, and legs. Some people with Pityriasis rosea have mild to moderate itching. How is Pityriasis rosea diagnosed? Your healthcare provider will examine your rash and ask about your symptoms. Your provider may take a sample of your skin to check for a fungal infection. How is Pityriasis rosea treated? Your healthcare provider may prescribe antihistamines or steroids to help reduce itching. They may be given as a pill or cream. Severe cases may be treated with ultraviolet light therapy. How can I manage my symptoms? Heat may irritate your skin and cause itching. Avoid hot showers and physical activity that may make your skin too warm. When should I call my doctor? Your rash does not improve within 10 weeks. Your itching becomes worse. Your rash becomes more red and you have fever. CARE AGREEMENT:   You have the right to help plan your care. Learn about your health condition and how it may be treated. Discuss treatment options with your healthcare providers to decide what care you want to receive. You always have the right to refuse treatment. The above information is an  only. It is not intended as medical advice for individual conditions or treatments. Talk to your doctor, nurse or pharmacist before following any medical regimen to see if it is safe and effective for you. © Copyright [employer] 2022 Information is for End User's use only and may not be sold, redistributed or otherwise used for commercial purposes.

## 2023-07-10 NOTE — PROGRESS NOTES
Name: Nancy Burnham      : 1985      MRN: [id number]  Encounter Provider: Jayson Nugent MD  Encounter Date: 7/10/2023   Encounter department: [facility] PRIMARY CARE    Assessment & Plan     1. Pityriasis rosea  -     Lyme Total Antibody Profile with reflex to WB; Future  -     C-reactive protein; Future  -     predniSONE 10 mg tablet; 4/d for 3 days, 3/d for  3 days, 2/d for  3 days, 1/d for  3 days    2. Rash  -     Lyme Total Antibody Profile with reflex to WB; Future  -     C-reactive protein; Future  -     predniSONE 10 mg tablet; 4/d for 3 days, 3/d for  3 days, 2/d for  3 days, 1/d for  3 days           Subjective      Has  Rash  He  Thought was  Poison ivy  But  Not  Following typical, no changes  Soap/laundry detergent. No one  Else  With rash, no  New  meds  Or  Foods, no insect  Or tick bites, no recent  Travel -was at  [facility] but  Got  Rash a  Week after  That, noticed  Rash  The day after  A  Gathering  At  His house    Review of Systems   Constitutional: Negative for activity change, appetite change and fatigue. Respiratory: Negative for shortness of breath. Cardiovascular: Negative for chest pain. Skin: Positive for rash. Neurological: Negative for dizziness and headaches. Current Outpatient Medications on File Prior to Visit   Medication Sig   • [DISCONTINUED] metroNIDAZOLE (FLAGYL) 500 mg tablet TAKE 1 TABLET 3 TIMES A DAY BY MOUTH NO ALCOHOL WHILE ON THIS MED (Patient not taking: Reported on 2023)       Objective     /78 (BP Location: Left arm, Patient Position: Sitting, Cuff Size: Large)   Pulse 72   Resp 20   Ht 6' (1.829 m)   Wt 89.8 kg (198 lb)   SpO2 97%   BMI 26.85 kg/m²     Physical Exam  Vitals reviewed. Constitutional:       Appearance: Normal appearance. Lymphadenopathy:      Cervical: No cervical adenopathy. Skin:     Findings: Rash present.       Comments: Rash seems to be  C/w pityriasis rosea   Neurological:      Mental Status: He is alert. Nicole Ortiz MD  BMI Counseling: Body mass index is 26.85 kg/m². The BMI is above normal. Nutrition recommendations include reducing portion sizes, decreasing overall calorie intake, 3-5 servings of fruits/vegetables daily, reducing fast food intake and consuming healthier snacks. Exercise recommendations include exercising 3-5 times per week.

## 2024-05-09 ENCOUNTER — TELEPHONE (OUTPATIENT)
Dept: FAMILY MEDICINE CLINIC | Facility: CLINIC | Age: 39
End: 2024-05-09

## 2024-05-09 NOTE — TELEPHONE ENCOUNTER
Called patient and left message to call back and schedule a OV paitent is due for physical / follow up.

## 2024-05-28 ENCOUNTER — TELEPHONE (OUTPATIENT)
Dept: FAMILY MEDICINE CLINIC | Facility: CLINIC | Age: 39
End: 2024-05-28

## 2024-05-28 NOTE — TELEPHONE ENCOUNTER
Please call pt and see what his actual request is and for what reason. I also do not see promethazine in his current or past med list. Does he just need something for flight anxiety?

## 2024-05-28 NOTE — TELEPHONE ENCOUNTER
Patient called the RX Refill Line. Message is being forwarded to the office.     Patient is requesting promethazine or something that was prescribed for him for flying a while ago. I dont see anything in his inactive list.      Please contact patient at  [phone]

## 2024-05-29 DIAGNOSIS — T75.3XXA MOTION SICKNESS, INITIAL ENCOUNTER: Primary | ICD-10-CM

## 2024-05-29 RX ORDER — SCOLOPAMINE TRANSDERMAL SYSTEM 1 MG/1
1 PATCH, EXTENDED RELEASE TRANSDERMAL
Qty: 4 PATCH | Refills: 0 | Status: SHIPPED | OUTPATIENT
Start: 2024-05-29

## 2024-05-29 NOTE — TELEPHONE ENCOUNTER
Patient called back to say the medication was prescribed over 5 years and he is not sure what it was. He would like a sea-sickness medication prescribed for his flight that will be on Tuesday 6/4/24.He said he can come in for OV but is concerned he will not be seen and have medication in time for travel.    rolling walker

## 2024-08-26 ENCOUNTER — TRANSITIONAL CARE MANAGEMENT (OUTPATIENT)
Dept: FAMILY MEDICINE CLINIC | Facility: CLINIC | Age: 39
End: 2024-08-26

## 2024-08-26 ENCOUNTER — OFFICE VISIT (OUTPATIENT)
Dept: FAMILY MEDICINE CLINIC | Facility: CLINIC | Age: 39
End: 2024-08-26
Payer: COMMERCIAL

## 2024-08-26 VITALS
DIASTOLIC BLOOD PRESSURE: 72 MMHG | HEIGHT: 72 IN | HEART RATE: 84 BPM | OXYGEN SATURATION: 97 % | SYSTOLIC BLOOD PRESSURE: 118 MMHG | BODY MASS INDEX: 24.52 KG/M2 | WEIGHT: 181 LBS

## 2024-08-26 DIAGNOSIS — Z90.49 S/P APPENDECTOMY: Primary | ICD-10-CM

## 2024-08-26 DIAGNOSIS — K59.00 CONSTIPATION, UNSPECIFIED CONSTIPATION TYPE: ICD-10-CM

## 2024-08-26 PROCEDURE — 99496 TRANSJ CARE MGMT HIGH F2F 7D: CPT | Performed by: NURSE PRACTITIONER

## 2024-08-26 RX ORDER — MAGNESIUM CARB/ALUMINUM HYDROX 105-160MG
296 TABLET,CHEWABLE ORAL ONCE
Qty: 296 ML | Refills: 0 | Status: SHIPPED | OUTPATIENT
Start: 2024-08-26 | End: 2024-08-26

## 2024-08-26 RX ORDER — OXYCODONE HYDROCHLORIDE 10 MG/1
10 TABLET ORAL EVERY 4 HOURS PRN
Qty: 60 TABLET | Refills: 0 | Status: SHIPPED | OUTPATIENT
Start: 2024-08-26 | End: 2024-09-05

## 2024-08-26 RX ORDER — IBUPROFEN 800 MG/1
800 TABLET, FILM COATED ORAL EVERY 6 HOURS PRN
Qty: 60 TABLET | Refills: 0 | Status: SHIPPED | OUTPATIENT
Start: 2024-08-26

## 2024-08-26 RX ORDER — OXYCODONE HYDROCHLORIDE 10 MG/1
TABLET ORAL
COMMUNITY
Start: 2024-08-24 | End: 2024-08-26 | Stop reason: SDUPTHER

## 2024-08-26 NOTE — ASSESSMENT & PLAN NOTE
Magnesium citrate was ordered to be used as directed to hopefully relieve the patient's constipation.  He was advised that if this medication does not relieve his constipation he should make the office aware.

## 2024-08-26 NOTE — PROGRESS NOTES
Transition of Care Visit  Name: Manfred Wolfe      : 1985      MRN: [id number]  Encounter Provider: ESTHELA Turpin  Encounter Date: 2024   Encounter department: [facility] PRIMARY CARE    Assessment & Plan   1. S/P appendectomy  Assessment & Plan:  Steri-Strips were left intact over the surgical incisions and the areas were once again dressed with a bandage.  Patient was advised to change the Band-Aid every other day for the next week.  The patient does have follow-up scheduled with the surgeon who completed the procedure next week.  He was advised that low-grade fevers can occur after surgery and he should take ibuprofen as needed for any fevers that develop.  He was advised that if he begins to experience any uncontrollable fevers or worsening abdominal pain not controlled with medication he should be evaluated in the ED.  Oxycodone 10 mg was reordered to be used every 4 hours as needed for his pain.  Ibuprofen 800 mg was also ordered to be used every 6 hours as needed for his pain.  He was advised to alternate these medications as needed.  Orders:  -     oxyCODONE (ROXICODONE) 10 MG TABS; Take 1 tablet (10 mg total) by mouth every 4 (four) hours as needed for moderate pain or severe pain for up to 10 days Max Daily Amount: 60 mg  -     ibuprofen (MOTRIN) 800 mg tablet; Take 1 tablet (800 mg total) by mouth every 6 (six) hours as needed for mild pain or moderate pain  2. Constipation, unspecified constipation type  Assessment & Plan:  Magnesium citrate was ordered to be used as directed to hopefully relieve the patient's constipation.  He was advised that if this medication does not relieve his constipation he should make the office aware.  Orders:  -     magnesium citrate (CITROMA) 1.745 g/30 mL oral solution; Take 296 mL by mouth once for 1 dose    Depression Screening and Follow-up Plan: Patient was screened for depression during today's encounter. They screened negative  with a PHQ-2 score of 0.        History of Present Illness     Transitional Care Management Review:   Manfred Wolfe is a 38 y.o. male here for TCM follow up.     During the TCM phone call patient stated:  TCM Call     Date and time call was made  8/26/2024  8:53 AM    Hospital care reviewed  Records not available    Patient was hospitialized at  Other (comment)    Comment  [facility]    Date of Admission  08/23/24    Date of discharge  08/24/24    Diagnosis  Appendectomy    Disposition  Home    Were the patients medications reviewed and updated  No    Current Symptoms  Incisional pain    Incisional pain severity  Mild    Incisional pain onset  Unable to assess      TCM Call     Should patient be enrolled in anticoag monitoring?  No    Scheduled for follow up?  Yes    Did you obtain your prescribed medications  No    Do you need help managing your prescriptions or medications  No    Is transportation to your appointment needed  No    I have advised the patient to call PCP with any new or worsening symptoms  Bridgette Domingo MA    Living Arrangements  Spouse or Significiant other    Support System  Spouse    Do you have social support  Yes, as much as I need    Are you recieving any outpatient services  No    Are you recieving home care services  No    Are you using any community resources  No    Current waiver services  No    Have you fallen in the last 12 months  No    Interperter language line needed  No        S/P appendectomy: Patient presented to the ED at [facility] in [place], [place] on 8/23/2024 due to acute right lower quadrant pain.  Patient was diagnosed with appendicitis at that time and did have an appendectomy completed on 8/23/2024.  Patient was discharged home on 8/24/2024.  The patient was prescribed oxycodone 10 mg to be used every 4 hours as needed for pain at discharge.  He reports that the oxycodone has been improving his pain but does not completely resolve it.   He also reports that he did have a low-grade fever of 100.4 yesterday but this did resolve after taking Tylenol.  He denies noting any fever or chills so far today.  The patient also reports that he has been having constipation since the surgery and he has not had a bowel movement since 8/23/2024 despite taking Colace.  The patient has not removed the bandages to check his incision since the procedure.    Review of Systems   Constitutional:  Positive for fever (1 episode). Negative for chills.   HENT:  Negative for ear pain and sore throat.    Eyes:  Negative for pain and visual disturbance.   Respiratory:  Negative for cough, chest tightness, shortness of breath and wheezing.    Cardiovascular:  Negative for chest pain, palpitations and leg swelling.   Gastrointestinal:  Positive for abdominal pain (lower-since surgery) and constipation. Negative for blood in stool, diarrhea, nausea and vomiting.   Endocrine: Negative for cold intolerance and heat intolerance.   Genitourinary:  Negative for decreased urine volume, dysuria and hematuria.   Musculoskeletal:  Negative for arthralgias, back pain and myalgias.   Skin:  Negative for color change and rash.   Allergic/Immunologic: Negative for environmental allergies.   Neurological:  Negative for dizziness, seizures, syncope, weakness, light-headedness, numbness and headaches.   Hematological:  Negative for adenopathy.   Psychiatric/Behavioral:  Negative for confusion. The patient is not nervous/anxious.    All other systems reviewed and are negative.    Objective     /72 (BP Location: Right arm, Patient Position: Sitting, Cuff Size: Standard)   Pulse 84   Ht 6' (1.829 m)   Wt 82.1 kg (181 lb)   SpO2 97%   BMI 24.55 kg/m²     Physical Exam  Vitals and nursing note reviewed.   Constitutional:       General: He is not in acute distress.     Appearance: Normal appearance. He is well-developed. He is not ill-appearing.   HENT:      Head: Normocephalic.   Eyes:       Conjunctiva/sclera: Conjunctivae normal.   Cardiovascular:      Rate and Rhythm: Normal rate and regular rhythm.      Pulses: Normal pulses.           Carotid pulses are 2+ on the right side and 2+ on the left side.       Radial pulses are 2+ on the right side and 2+ on the left side.        Posterior tibial pulses are 2+ on the right side and 2+ on the left side.      Heart sounds: Normal heart sounds. No murmur heard.  Pulmonary:      Effort: Pulmonary effort is normal. No respiratory distress.      Breath sounds: Normal breath sounds. No decreased breath sounds, wheezing, rhonchi or rales.   Abdominal:      General: Abdomen is flat. Bowel sounds are normal. There is no distension.      Palpations: Abdomen is soft.      Tenderness: There is abdominal tenderness in the right lower quadrant and left lower quadrant. There is no guarding.       Musculoskeletal:         General: No swelling. Normal range of motion.      Cervical back: Normal range of motion and neck supple.      Right lower leg: No edema.      Left lower leg: No edema.   Skin:     General: Skin is warm and dry.      Capillary Refill: Capillary refill takes less than 2 seconds.   Neurological:      General: No focal deficit present.      Mental Status: He is alert and oriented to person, place, and time.   Psychiatric:         Mood and Affect: Mood normal.         Behavior: Behavior normal.         Thought Content: Thought content normal.         Judgment: Judgment normal.       Medications have been reviewed by provider in current encounter    Administrative Statements

## 2024-08-26 NOTE — ASSESSMENT & PLAN NOTE
Steri-Strips were left intact over the surgical incisions and the areas were once again dressed with a bandage.  Patient was advised to change the Band-Aid every other day for the next week.  The patient does have follow-up scheduled with the surgeon who completed the procedure next week.  He was advised that low-grade fevers can occur after surgery and he should take ibuprofen as needed for any fevers that develop.  He was advised that if he begins to experience any uncontrollable fevers or worsening abdominal pain not controlled with medication he should be evaluated in the ED.  Oxycodone 10 mg was reordered to be used every 4 hours as needed for his pain.  Ibuprofen 800 mg was also ordered to be used every 6 hours as needed for his pain.  He was advised to alternate these medications as needed.

## 2024-08-28 ENCOUNTER — TELEPHONE (OUTPATIENT)
Age: 39
End: 2024-08-28

## 2024-08-28 DIAGNOSIS — Z09 FOLLOW-UP SURGERY CARE: ICD-10-CM

## 2024-08-28 DIAGNOSIS — Z90.49 S/P APPENDECTOMY: Primary | ICD-10-CM

## 2024-08-28 NOTE — TELEPHONE ENCOUNTER
Pt saw Kyler Melararachel on 8/26 for a TCM after having an appendectomy while on vacation.      At that time he planned to follow up with the general surgeon that performed the surgery, not realizing it was 4+ hours away.    At his TCM, pt stated Kyler advised he could see a [facility] general surgeon for his follow up, if that was more convenient.  He called today to request the referral to do so.    Pt requested a return call to advise.

## 2024-08-28 NOTE — TELEPHONE ENCOUNTER
Leonora RUEDA RN Case Manager [phone] ext [phone]. She wanted to make the PCP office aware that she is available to

## 2024-08-30 ENCOUNTER — APPOINTMENT (OUTPATIENT)
Dept: LAB | Facility: HOSPITAL | Age: 39
End: 2024-08-30
Payer: COMMERCIAL

## 2024-08-30 ENCOUNTER — HOSPITAL ENCOUNTER (EMERGENCY)
Facility: HOSPITAL | Age: 39
Discharge: HOME | End: 2024-08-30
Attending: EMERGENCY MEDICINE
Payer: COMMERCIAL

## 2024-08-30 ENCOUNTER — NURSE TRIAGE (OUTPATIENT)
Dept: OTHER | Facility: OTHER | Age: 39
End: 2024-08-30

## 2024-08-30 ENCOUNTER — OFFICE VISIT (OUTPATIENT)
Dept: FAMILY MEDICINE CLINIC | Facility: CLINIC | Age: 39
End: 2024-08-30
Payer: COMMERCIAL

## 2024-08-30 ENCOUNTER — HOSPITAL ENCOUNTER (OUTPATIENT)
Dept: CT IMAGING | Facility: HOSPITAL | Age: 39
Discharge: HOME | End: 2024-08-30
Payer: COMMERCIAL

## 2024-08-30 VITALS
HEIGHT: 72 IN | DIASTOLIC BLOOD PRESSURE: 70 MMHG | OXYGEN SATURATION: 94 % | BODY MASS INDEX: 24.33 KG/M2 | WEIGHT: 179.6 LBS | TEMPERATURE: 97.2 F | SYSTOLIC BLOOD PRESSURE: 118 MMHG | HEART RATE: 74 BPM

## 2024-08-30 VITALS
BODY MASS INDEX: 24.14 KG/M2 | SYSTOLIC BLOOD PRESSURE: 149 MMHG | RESPIRATION RATE: 18 BRPM | HEART RATE: 76 BPM | DIASTOLIC BLOOD PRESSURE: 82 MMHG | TEMPERATURE: 97.8 F | OXYGEN SATURATION: 100 % | WEIGHT: 178 LBS

## 2024-08-30 DIAGNOSIS — Z90.49 HISTORY OF LAPAROSCOPIC APPENDECTOMY: ICD-10-CM

## 2024-08-30 DIAGNOSIS — M60.9 MYOSITIS: ICD-10-CM

## 2024-08-30 DIAGNOSIS — L03.311 ABDOMINAL WALL CELLULITIS: ICD-10-CM

## 2024-08-30 DIAGNOSIS — R10.9 ABDOMINAL PAIN, UNSPECIFIED ABDOMINAL LOCATION: ICD-10-CM

## 2024-08-30 DIAGNOSIS — R10.9 ABDOMINAL PAIN: Primary | ICD-10-CM

## 2024-08-30 DIAGNOSIS — K91.89 POSTOPERATIVE SURGICAL COMPLICATION INVOLVING DIGESTIVE SYSTEM ASSOCIATED WITH DIGESTIVE SYSTEM PROCEDURE, UNSPECIFIED COMPLICATION: ICD-10-CM

## 2024-08-30 DIAGNOSIS — K91.89 POSTOPERATIVE SURGICAL COMPLICATION INVOLVING DIGESTIVE SYSTEM ASSOCIATED WITH DIGESTIVE SYSTEM PROCEDURE, UNSPECIFIED COMPLICATION: Primary | ICD-10-CM

## 2024-08-30 DIAGNOSIS — K59.03 DRUG-INDUCED CONSTIPATION: ICD-10-CM

## 2024-08-30 DIAGNOSIS — Z90.49 S/P APPENDECTOMY: ICD-10-CM

## 2024-08-30 LAB
ALBUMIN SERPL BCG-MCNC: 4.6 G/DL (ref 3.5–5)
ALP SERPL-CCNC: 54 U/L (ref 34–104)
ALT SERPL W P-5'-P-CCNC: 15 U/L (ref 7–52)
ANION GAP SERPL CALCULATED.3IONS-SCNC: 5 MMOL/L (ref 4–13)
AST SERPL W P-5'-P-CCNC: 15 U/L (ref 13–39)
BASOPHILS # BLD AUTO: 0.04 THOUSANDS/ÂΜL (ref 0–0.1)
BASOPHILS NFR BLD AUTO: 1 % (ref 0–1)
BILIRUB SERPL-MCNC: 0.41 MG/DL (ref 0.2–1)
BUN SERPL-MCNC: 12 MG/DL (ref 5–25)
CALCIUM SERPL-MCNC: 9.4 MG/DL (ref 8.4–10.2)
CHLORIDE SERPL-SCNC: 99 MMOL/L (ref 96–108)
CK SERPL-CCNC: 67 U/L (ref 39–308)
CO2 SERPL-SCNC: 30 MMOL/L (ref 21–32)
CREAT SERPL-MCNC: 0.88 MG/DL (ref 0.6–1.3)
EOSINOPHIL # BLD AUTO: 0.2 THOUSAND/ÂΜL (ref 0–0.61)
EOSINOPHIL NFR BLD AUTO: 3 % (ref 0–6)
ERYTHROCYTE [DISTWIDTH] IN BLOOD BY AUTOMATED COUNT: 11.9 % (ref 11.6–15.1)
GFR SERPL CREATININE-BSD FRML MDRD: 108 ML/MIN/1.73SQ M
GLUCOSE SERPL-MCNC: 83 MG/DL (ref 65–140)
HCT VFR BLD AUTO: 43.5 % (ref 36.5–49.3)
HGB BLD-MCNC: 14.9 G/DL (ref 12–17)
IMM GRANULOCYTES # BLD AUTO: 0.05 THOUSAND/UL (ref 0–0.2)
IMM GRANULOCYTES NFR BLD AUTO: 1 % (ref 0–2)
LYMPHOCYTES # BLD AUTO: 1.82 THOUSANDS/ÂΜL (ref 0.6–4.47)
LYMPHOCYTES NFR BLD AUTO: 31 % (ref 14–44)
MCH RBC QN AUTO: 31.1 PG (ref 26.8–34.3)
MCHC RBC AUTO-ENTMCNC: 34.3 G/DL (ref 31.4–37.4)
MCV RBC AUTO: 91 FL (ref 82–98)
MONOCYTES # BLD AUTO: 0.41 THOUSAND/ÂΜL (ref 0.17–1.22)
MONOCYTES NFR BLD AUTO: 7 % (ref 4–12)
NEUTROPHILS # BLD AUTO: 3.3 THOUSANDS/ÂΜL (ref 1.85–7.62)
NEUTS SEG NFR BLD AUTO: 57 % (ref 43–75)
NRBC BLD AUTO-RTO: 0 /100 WBCS
PLATELET # BLD AUTO: 218 THOUSANDS/UL (ref 149–390)
PMV BLD AUTO: 10.1 FL (ref 8.9–12.7)
POTASSIUM SERPL-SCNC: 5 MMOL/L (ref 3.5–5.3)
PROT SERPL-MCNC: 7.7 G/DL (ref 6.4–8.4)
RBC # BLD AUTO: 4.79 MILLION/UL (ref 3.88–5.62)
SODIUM SERPL-SCNC: 134 MMOL/L (ref 135–147)
WBC # BLD AUTO: 5.82 THOUSAND/UL (ref 4.31–10.16)

## 2024-08-30 PROCEDURE — 80053 COMPREHEN METABOLIC PANEL: CPT | Performed by: PHYSICIAN ASSISTANT

## 2024-08-30 PROCEDURE — 99214 OFFICE O/P EST MOD 30 MIN: CPT | Performed by: PHYSICIAN ASSISTANT

## 2024-08-30 PROCEDURE — 99283 EMERGENCY DEPT VISIT LOW MDM: CPT

## 2024-08-30 PROCEDURE — 74160 CT ABDOMEN W/CONTRAST: CPT

## 2024-08-30 PROCEDURE — 96372 THER/PROPH/DIAG INJ SC/IM: CPT

## 2024-08-30 PROCEDURE — 85025 COMPLETE CBC W/AUTO DIFF WBC: CPT | Performed by: PHYSICIAN ASSISTANT

## 2024-08-30 PROCEDURE — 99285 EMERGENCY DEPT VISIT HI MDM: CPT | Performed by: EMERGENCY MEDICINE

## 2024-08-30 PROCEDURE — 36415 COLL VENOUS BLD VENIPUNCTURE: CPT | Performed by: PHYSICIAN ASSISTANT

## 2024-08-30 PROCEDURE — 82550 ASSAY OF CK (CPK): CPT | Performed by: EMERGENCY MEDICINE

## 2024-08-30 RX ORDER — KETOROLAC TROMETHAMINE 30 MG/ML
15 INJECTION, SOLUTION INTRAMUSCULAR; INTRAVENOUS ONCE
Status: COMPLETED | OUTPATIENT
Start: 2024-08-30 | End: 2024-08-30

## 2024-08-30 RX ORDER — CEPHALEXIN 500 MG/1
500 CAPSULE ORAL EVERY 6 HOURS SCHEDULED
Qty: 28 CAPSULE | Refills: 0 | Status: SHIPPED | OUTPATIENT
Start: 2024-08-30 | End: 2024-09-06

## 2024-08-30 RX ORDER — FAMOTIDINE 20 MG/1
20 TABLET, FILM COATED ORAL 2 TIMES DAILY
Qty: 14 TABLET | Refills: 0 | Status: SHIPPED | OUTPATIENT
Start: 2024-08-30

## 2024-08-30 RX ADMIN — KETOROLAC TROMETHAMINE 15 MG: 30 INJECTION, SOLUTION INTRAMUSCULAR; INTRAVENOUS at 22:46

## 2024-08-30 RX ADMIN — IOHEXOL 100 ML: 350 INJECTION, SOLUTION INTRAVENOUS at 15:40

## 2024-08-30 RX ADMIN — CEPHALEXIN 500 MG: 250 CAPSULE ORAL at 22:46

## 2024-08-30 NOTE — PROGRESS NOTES
Ambulatory Visit  Name: Manfred Wolfe      : 1985      MRN: [id number]  Encounter Provider: Macy Reveles PA-C  Encounter Date: 2024   Encounter department: [facility]    Assessment & Plan   1. Postoperative surgical complication involving digestive system associated with digestive system procedure, unspecified complication  Comments:  Pain and abdominal swelling/mass persists and is worsening.  Check imaging to rule out abscess versus seroma.  CBC CMP.  Orders:  -     CT abdomen w contrast; Future; Expected date: 2024  2. Abdominal pain, unspecified abdominal location  -     CBC and differential  -     Comprehensive metabolic panel  -     CT abdomen w contrast; Future; Expected date: 2024  3. History of laparoscopic appendectomy  -     CT abdomen w contrast; Future; Expected date: 2024  4. S/P appendectomy  Assessment & Plan:  PT had an abnormal appendectomy case where his white count was normal and the surgeon stated that his appendix was about 5 times normal which is much bigger than usual.  They did do pathology but results are not back although he keeps checking.  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    5. Drug-induced constipation  Comments:  Increase fluids, Colace 100 mg twice daily May use MiraLAX as needed as well.       History of Present Illness     Patient presents with:  Follow-up: Appendectomy, still having pain, really lightheaded and dizziness, constipation, headache. Saw DL on Monday for similar.    Patient states that pain is a 6 out of 10 and getting worse.  Laparoscopic appendectomy performed 1 week ago at [facility].  He has an appointment in 1 week with a surgeon here in the area who was not the performing physician for us to check and patient was seen on Monday with concerns that have not resolved and have actually gotten worse.    Patient states that it feels like a stabbing burning pain in the 1 area of  his abdomen with any movement which involves his abdominal muscles.  He is also now starting to be little lightheaded when he is sitting but not moving and has a headache in the front of his head.  He did take 2 bottles of magnesium citrate and had a total evacuation of intestinal contents Tuesday.  Has not had any fevers that he is aware of but has not had a great appetite and has not had a bowel movement since Wednesday.        Review of Systems   Constitutional: Negative.    HENT: Negative.     Eyes: Negative.    Respiratory: Negative.     Cardiovascular: Negative.    Gastrointestinal:  Positive for abdominal pain.   Endocrine: Negative.    Genitourinary: Negative.    Musculoskeletal: Negative.    Skin: Negative.    Allergic/Immunologic: Negative.    Neurological: Negative.    Hematological: Negative.    Psychiatric/Behavioral: Negative.         Objective     /70 (BP Location: Right arm, Patient Position: Sitting, Cuff Size: Standard)   Pulse 74   Temp (!) 97.2 °F (36.2 °C) (Tympanic)   Ht 6' (1.829 m)   Wt 81.5 kg (179 lb 9.6 oz)   SpO2 94%   BMI 24.36 kg/m²     Physical Exam  Vitals and nursing note reviewed.   Constitutional:       Appearance: Normal appearance.   HENT:      Head: Normocephalic and atraumatic.   Eyes:      General: Lids are normal.      Conjunctiva/sclera: Conjunctivae normal.      Pupils: Pupils are equal, round, and reactive to light.   Cardiovascular:      Rate and Rhythm: Normal rate and regular rhythm.      Heart sounds: Normal heart sounds.   Pulmonary:      Effort: Pulmonary effort is normal.      Breath sounds: Normal breath sounds.   Abdominal:          Comments: 3 incisions from laparoscopic procedure healing well no signs of erythema edema nontender no swelling.  Patient has a palpable soft extremely tender swelling/mass in the left mid lower quadrant adjacent to the incisions that is roughly 6 to 8 cm in diameter.   Musculoskeletal:      Comments: Patient very  uncomfortable getting into the reclined position and then also getting himself back to sitting position from laying down.   Skin:     General: Skin is warm and dry.   Neurological:      General: No focal deficit present.      Mental Status: He is alert. Mental status is at baseline.   Psychiatric:         Mood and Affect: Mood normal.         Behavior: Behavior normal.         Thought Content: Thought content normal.         Judgment: Judgment normal.       Administrative Statements

## 2024-08-30 NOTE — TELEPHONE ENCOUNTER
"Regarding: stat CT results  ----- Message from Arielle BUSTAMANTE sent at 8/30/2024  7:37 PM EDT -----  \" I was seen this afternoon and was ordered to get a STAT CT scan, the results were put in my MyChart 3 hours ago. I would like to know the results    "

## 2024-08-30 NOTE — ASSESSMENT & PLAN NOTE
PT had an abnormal appendectomy case where his white count was normal and the surgeon stated that his appendix was about 5 times normal which is much bigger than usual.  They did do pathology but results are not back although he keeps checking.  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

## 2024-08-30 NOTE — PATIENT INSTRUCTIONS
1. Postoperative surgical complication involving digestive system associated with digestive system procedure, unspecified complication  Comments:  Pain and abdominal swelling/mass persists and is worsening.  Check imaging to rule out abscess versus seroma.  CBC CMP.  Orders:  -     CT abdomen w contrast; Future; Expected date: 08/30/2024  2. Abdominal pain, unspecified abdominal location  -     CBC and differential  -     Comprehensive metabolic panel  -     CT abdomen w contrast; Future; Expected date: 08/30/2024  3. History of laparoscopic appendectomy  -     CT abdomen w contrast; Future; Expected date: 08/30/2024

## 2024-08-31 NOTE — TELEPHONE ENCOUNTER
Spoke with patient and apologized for the delay in contacting him back and reaching a provider. The on-call provider advised the patient go to the ER for evaluation and possible antibiotics. The patient understood the recommendation and reasoning but was frustrated because he saw results in MyChart from 4:30pm. Advised the patient RN contacted him back as soon as she had the providers advice after reviewing the scan results. Patient was frustrated but understanding and was en route to ER for evaluation.

## 2024-08-31 NOTE — TELEPHONE ENCOUNTER
"Regarding: stat CT results  ----- Message from Margarita ST sent at 8/30/2024  8:11 PM EDT -----  \" I spoke to someone and was told that the on call doctor would be calling me within 5 minutes. I have not received a call back as of yet\"    "

## 2024-08-31 NOTE — TELEPHONE ENCOUNTER
Patient calling for results of CT that he went for today.   Reason for Disposition  • Sounds like a serious complication to the triager    Protocols used: Post-Op Incision Symptoms and Questions-ADULT-

## 2024-08-31 NOTE — ED PROVIDER NOTES
History  Chief Complaint   Patient presents with   • Abdominal Pain     Pt c/o abd pain since appendectomy last Friday, pt reports an abnormal CT today. Tolerating PO, c/o nausea, HA, dizziness     30-year-old male presents for evaluation of abdominal pain at the site of recent upper scopic appendectomy, patient had the appendectomy 1 week ago at [facility] has been having lower abdominal pain constant also has been constipated for which his PCP did start him on Colace.  No Nausea no vomiting no fevers or chills.  Patient states that the pain more midline in his abdominal muscles feels internal and is worse whenever he contracts the abdominal muscles.  Does state that he has been using oxycodone to sleep at night but also has been using a bowel regimen due to his constipation        Prior to Admission Medications   Prescriptions Last Dose Informant Patient Reported? Taking?   ibuprofen (MOTRIN) 800 mg tablet  Self No No   Sig: Take 1 tablet (800 mg total) by mouth every 6 (six) hours as needed for mild pain or moderate pain   magnesium citrate (CITROMA) 1.745 g/30 mL oral solution   No No   Sig: Take 296 mL by mouth once for 1 dose   oxyCODONE (ROXICODONE) 10 MG TABS  Self No No   Sig: Take 1 tablet (10 mg total) by mouth every 4 (four) hours as needed for moderate pain or severe pain for up to 10 days Max Daily Amount: 60 mg      Facility-Administered Medications: None       History reviewed. No pertinent past medical history.    Past Surgical History:   Procedure Laterality Date   • ANTERIOR CRUCIATE LIGAMENT REPAIR         Family History   Problem Relation Age of Onset   • No Known Problems Mother    • No Known Problems Father      I have reviewed and agree with the history as documented.    E-Cigarette/Vaping   • E-Cigarette Use Never User      E-Cigarette/Vaping Substances   • Nicotine No    • THC No    • CBD No    • Flavoring No    • Other No    • Unknown No      Social History     Tobacco Use   •  Smoking status: Never   • Smokeless tobacco: Never   Vaping Use   • Vaping status: Never Used       Review of Systems   Constitutional:  Negative for appetite change, chills and fever.   HENT:  Negative for rhinorrhea and sore throat.    Eyes:  Negative for photophobia and visual disturbance.   Respiratory:  Negative for cough and shortness of breath.    Cardiovascular:  Negative for chest pain and palpitations.   Gastrointestinal:  Positive for abdominal pain and constipation. Negative for diarrhea.   Genitourinary:  Negative for dysuria, frequency and urgency.   Skin:  Negative for rash.   Neurological:  Negative for dizziness and weakness.   All other systems reviewed and are negative.      Physical Exam  Physical Exam  Vitals and nursing note reviewed.   Constitutional:       Appearance: He is well-developed.   HENT:      Head: Normocephalic and atraumatic.      Right Ear: External ear normal.      Left Ear: External ear normal.      Mouth/Throat:      Mouth: Oropharynx is clear and moist.   Eyes:      Extraocular Movements: EOM normal.      Conjunctiva/sclera: Conjunctivae normal.      Pupils: Pupils are equal, round, and reactive to light.   Neck:      Vascular: No JVD.      Trachea: No tracheal deviation.   Cardiovascular:      Rate and Rhythm: Normal rate and regular rhythm.      Heart sounds: Normal heart sounds. No murmur heard.     No friction rub. No gallop.   Pulmonary:      Effort: Pulmonary effort is normal. No respiratory distress.      Breath sounds: No stridor. No wheezing or rales.   Abdominal:      General: There is no distension.      Palpations: Abdomen is soft. There is no mass.      Tenderness: There is abdominal tenderness. There is no guarding or rebound.      Comments: Mild tenderness to palpation in the area medial to the port incision, the incision is healing well no overlying erythema, no discharge, no palpable fluctuance   Musculoskeletal:         General: No edema. Normal range of  motion.      Cervical back: Normal range of motion and neck supple.   Skin:     General: Skin is warm and dry.      Coloration: Skin is not pale.      Findings: No erythema or rash.   Neurological:      Mental Status: He is alert and oriented to person, place, and time.      Cranial Nerves: No cranial nerve deficit.   Psychiatric:         Mood and Affect: Mood and affect normal.         Vital Signs  ED Triage Vitals [08/30/24 2129]   Temperature Pulse Respirations Blood Pressure SpO2   97.8 °F (36.6 °C) 76 18 149/82 100 %      Temp Source Heart Rate Source Patient Position - Orthostatic VS BP Location FiO2 (%)   Oral Monitor Sitting Right arm --      Pain Score       2           Vitals:    08/30/24 2129   BP: 149/82   Pulse: 76   Patient Position - Orthostatic VS: Sitting         Visual Acuity      ED Medications  Medications   ketorolac (TORADOL) injection 15 mg (has no administration in time range)       Diagnostic Studies  Results Reviewed       Procedure Component Value Units Date/Time    CK [[id number]]  (Normal) Collected: 08/30/24 1555    Lab Status: Final result Specimen: Blood Updated: 08/30/24 2154     Total CK 67 U/L                    No orders to display              Procedures  Procedures         ED Course  ED Course as of 08/31/24 0404   Fri Aug 30, 2024   2140 Medical record reviewed patient seen for postoperative pain by PCP earlier today , ct ordered and results showed cellulitis and myositis  at the port site. Will have surgery evaluate patient   2155 Refusing further lab work as he stated that he was sent here for evaluation for possible antibiotics   2214 Reached out to surgical PA on-call in regards to patient's continued abdominal pain current CK normal, lab work obtained today reviewed unremarkable, CT imaging reviewed    Niccoli the area appears well-healing with no overt signs of cellulitis, he is afebrile with no other SIRS criteria, will start on Keflex, NSAIDs, Tylenol and close follow-up  with PCP    Did discuss with the patient that he should use oxycodone only for breakthrough pain and focus on bowel regimen due to the ongoing constipation   2224 Discussed with surgical PA on-call, okay with NSAIDs, Keflex, stable for outpatient follow-up will return to PCP and follow-up with general surgery as needed formation provided, no indication for further inpatient evaluation or treatment                                 SBIRT 20yo+      Flowsheet Row Most Recent Value   Initial Alcohol Screen: US AUDIT-C     1. How often do you have a drink containing alcohol? 0 Filed at: 08/30/2024 2144   2. How many drinks containing alcohol do you have on a typical day you are drinking?  0 Filed at: 08/30/2024 2144   3a. Male UNDER 65: How often do you have five or more drinks on one occasion? 0 Filed at: 08/30/2024 2144   Audit-C Score 0 Filed at: 08/30/2024 2144   JULIA: How many times in the past year have you...    Used an illegal drug or used a prescription medication for non-medical reasons? Never Filed at: 08/30/2024 2144                      Medical Decision Making  38-year-old male with cellulitis/myositis at the site of port insertion due to recent appendectomy, obtain CK to evaluate for rhabdomyolysis, significant myositis, lab work obtained earlier today results available in epic showing no significant electrolyte abnormalities, dehydration, no significantly elevated white blood cell count, CT of the abdomen showing mild cellulitis and myositis with no intra-abdominal abscess    Patient is afebrile in no acute distress will likely be stable for oral antibiotics and discharge will consider surgical consultation if pain not significantly improved    Amount and/or Complexity of Data Reviewed  Labs: ordered.    Risk  Prescription drug management.                 Disposition  Final diagnoses:   None     ED Disposition       None          Follow-up Information    None         Patient's Medications   Discharge  Prescriptions    No medications on file       No discharge procedures on file.    PDMP Review         Value Time User    PDMP Reviewed  Yes 8/26/2024  1:10 PM ESTHELA Turpin            ED Provider  Electronically Signed by             Viji Connolly DO  08/31/24 8791

## 2024-08-31 NOTE — DISCHARGE INSTRUCTIONS
Take Acetaminophen 2 extra strength tablets every 4-6 hours up to 3 times daily. Do not exceed 3 g in a 24-hour period   Take Ibuprofen 600 mg every 6-8 hours      Oxycodone only for breakthrough pain    Bowel Regiment  MiraLAX: 3 times a day for 3 days, then once daily  Colace: 1 pill twice a day  Citrucel: As directed on the bottle  You should drink at least 1.5 L of water per day    If you have been taking narcotic pain medication you should add:  Senokot: 2 tablets at bedtime

## 2025-02-07 NOTE — PROGRESS NOTES
Orthopaedic Surgery - Office Note  Rubén Allen (14 y o  male)   : 1985   MRN: [id number]  Encounter Date: 2022    Chief Complaint   Patient presents with    Right Hand - Pain         Assessment/Plan  Diagnoses and all orders for this visit:    Right wrist pain  -     XR wrist 3+ vw right; Future  -     [place] MSK limited; Future  -     Durable Medical Equipment    Acute carpal tunnel syndrome of right wrist  -     Ambulatory Referral to Hand Surgery  -     [place] MSK limited; Future  -     Durable Medical Equipment    Numbness of right hand  -     Ambulatory Referral to Hand Surgery  -     [address] MSK limited; Future  -     Durable Medical Equipment    I discussed with the patient that his symptoms appear to be an irritated nerve as his main symptom generator  I would recommend a carpal tunnel cock-up wrist splint to be worn at night  I have recommended ergonomic changes at his computer keyboard  He will also be sent for an ultrasound to evaluate for carpal tunnel impingement  He will return to discuss the ultrasound results with the hand surgeon  He will also be provided a home exercise program for the carpal tunnel symptoms  He may continue the ibuprofen  His symptoms do not appear to be related to a de Quervain tenosynovitis or lateral epicondylitis at this time  Return to discuss us results with Dr Tremaine Hernandez  History of Present Illness  Daphnie Cardona is a new patient with right hand and wrist pain  He discussed the symptoms with his PCP on 2022 who started him in physical therapy for de Quervain tenosynovitis and lateral epicondylitis  He was also started on prescription dose ibuprofen  He presents today for evaluation and treatment  No trauma is reported  He is right-hand dominant  Patient reports the therapy has not helped him  He describes his symptoms as a numbness in the palmar aspect and thenar region of the right hand    The symptoms have been ongoing for about for 5 Patient Name: Traci Petersen     : 1968    MRN: [id number]     Subjective:     Patient ID: Traci Petersen is a 56 y.o. female.    Chief Complaint:   Chief Complaint   Patient presents with    Follow-up     Pt is here for follow up. Pt c/o increased daily HA's with nocturia        HPI: 25:  FU CTS, Vit D def, foot pain, Insomnia, Type 2 DM, Anxiety, HLD, HTN.      Vitamin D deficiency:  WNL in January.      Bilateral carpal tunnel:  Pt has seen ortho for this issue.  HX: She had surgery on right wrist for carpal tunnel release 1 year ago and is still having pain/issues.  Left wrist hurting. Had CSI injection in May. She is on Diclofenac for her wrist and needs refills.        c/o Foot pain left anterior foot and under foot-  Is seeing someone in [place]. at [facility].      Insomnia-  Lexapro 20 mg po daily.  Unsure is Lexapro is causing HA's.  States has improved insomnia with Lexapro.  Was not currently on medications for anxiety at last visit, had tried and failed Zoloft.  Lexapro is not helping her anxiety.  Would like to switch it for something else.       Anxiety-  Lexapro was increased to 20mg last visit.  Previously has tried and failed Zoloft only.  Thinks maybe this is causing her HA's and would like to switch.     Type 2 DM-  A1C 5.8 last visit, microalbumin due was WNL last year. She is only on Metformin 500mg po BID with meals currently which has her diabetes well-controlled.  Due for Fundus today. Due for foot exam.     HLD-  FLP last done in January and WNL. Is on Lipitor.      HTN-  Chronic, stable today.  Amlodipine refilled today.  HCTZ refilled.   /82, is compliant with BP medications.     Health Maintenance-  States did Cologard but still stating she needs CRC  MMG    PAP UTD                     ROS:      Review of Systems   Musculoskeletal:  Positive for joint pain and myalgias.   Neurological:  Positive for headaches.   All other systems reviewed  and are negative.          History:     Past Medical History:   Diagnosis Date    Acquired absence of both cervix and uterus 01/02/1989    Carpal tunnel syndrome     Dyspareunia, female 10/03/2023    High cholesterol     HTN (hypertension)     Type 2 diabetes mellitus, without long-term current use of insulin 10/25/2022    Lab Results  Component  Value  Date     HGBA1C  6.0  05/02/2023     Metformin, continue  Last foot: 10/3/23  Fundus: June 2023  Microalbumin: June 2023        Past Surgical History:   Procedure Laterality Date    CARPAL TUNNEL RELEASE Right     CHOLECYSTECTOMY      COLONOSCOPY N/A 12/6/2024    Procedure: COLONOSCOPY;  Surgeon: GREGORIA Vazquez MD;  Location: [facility] ENDOSCOPY;  Service: Gastroenterology;  Laterality: N/A;    HYSTERECTOMY      TONSILLECTOMY      TUBAL LIGATION         Family History   Problem Relation Name Age of Onset    No Known Problems Mother      No Known Problems Father          Social History     Tobacco Use    Smoking status: Never    Smokeless tobacco: Never   Substance and Sexual Activity    Alcohol use: Yes     Comment: OCC    Drug use: No    Sexual activity: Yes       Current Outpatient Medications   Medication Instructions    amLODIPine (NORVASC) 10 mg, Oral, Daily    atorvastatin (LIPITOR) 40 mg, Oral, Nightly    blood-glucose meter Misc 1 each, Other, 2 times daily    diclofenac (VOLTAREN) 75 mg, Oral, 2 times daily    [START ON 3/17/2025] DULoxetine (CYMBALTA) 30 mg, Oral, Daily    EScitalopram oxalate (LEXAPRO) 20 mg, Oral, Daily    EScitalopram oxalate (LEXAPRO) 5 mg, Oral, Daily    gabapentin (NEURONTIN) 300 mg, Oral, 3 times daily    hydroCHLOROthiazide 12.5 mg, Oral, Daily    metFORMIN (GLUCOPHAGE) 500 mg, Oral, 2 times daily with meals    polyethylene glycol (MOVIPREP) 100-7.5-2.691 gram solution Take as directed prior to colonoscopy        Review of patient's allergies indicates:  No Known Allergies    Objective:     Visit Vitals  /82 (BP Location: Left  weeks without any known trauma  With certain range of motions of the wrist he can reproduce shooting pain into the base of his thumb  He reports an associated weakness occasionally with grasping  He does not notice any increased symptoms at night or in the morning  He works as a   He has not had problems like this in the past   He does not report any pain at the base of the radial styloid or lateral epicondyle today in the office  Review of Systems  Pertinent items are noted in HPI  All other systems were reviewed and are negative  Physical Exam  /76   Ht 6' (1 829 m)   Wt 90 7 kg (200 lb)   BMI 27 12 kg/m²   Cons: Appears well  No apparent distress  Psych: Alert  Oriented x3  Mood and affect normal   Eyes: PERRLA, EOMI  Resp: Normal effort  No audible wheezing or stridor  CV: Palpable pulse  No discernable arrhythmia  Lymph:  No palpable cervical, axillary, or inguinal lymphadenopathy  Skin: Warm  No palpable masses  No visible lesions  Neuro: Normal muscle tone  Normal and symmetric DTR's  Patient's right wrist has no skin breakdown lesions or signs of infection  He has a mildly positive Phalen's reproducing some numbness sensation in the thenar eminence of the palmar aspect of the right hand  There is no thenar atrophy or wasting  He has a negative Finkelstein's test   He has no CMC joint tenderness  He has no tenderness at the anatomical snuffbox  His right wrist range of motion is within normal limits  Wrist strength is 5/5  He has full range of motion and normal strength at all digits in the right hand  His capillary refill is normal   Distal radius and ulnar pulses are +2  His elbow exam is within normal limits  Studies Reviewed  X-rays performed in the office today three views of the right wrist show no acute fractures or dislocations  No significant degenerative changes are seen    These read from orthopedic standpoint will await "arm, Patient Position: Sitting)   Pulse 80   Temp 98.1 °F (36.7 °C) (Oral)   Resp 18   Ht 5' 2" (1.575 m)   Wt 82.1 kg (181 lb)   SpO2 98%   BMI 33.11 kg/m²       Physical Examination:     Physical Exam  Vitals reviewed.   Constitutional:       Appearance: Normal appearance. She is normal weight.   HENT:      Head: Normocephalic.   Cardiovascular:      Rate and Rhythm: Normal rate and regular rhythm.      Pulses: Normal pulses.      Heart sounds: Normal heart sounds.   Pulmonary:      Effort: Pulmonary effort is normal.      Breath sounds: Normal breath sounds.   Abdominal:      General: Abdomen is flat.      Palpations: Abdomen is soft.   Musculoskeletal:         General: Normal range of motion.      Cervical back: Normal range of motion.   Skin:     General: Skin is warm and dry.   Neurological:      Mental Status: She is alert.   Psychiatric:         Mood and Affect: Mood normal.         Lab Results:     Chemistry:  Lab Results   Component Value Date     01/31/2024    K 3.8 01/31/2024    BUN 10.2 01/31/2024    CREATININE 0.66 01/31/2024    EGFRNORACEVR >60 01/31/2024    GLUCOSE 143 (H) 01/31/2024    CALCIUM 9.1 01/31/2024    ALKPHOS 75 01/31/2024    LABPROT 7.5 01/31/2024    ALBUMIN 3.8 01/31/2024    AST 17 01/31/2024    ALT 13 01/31/2024    GCWFNXCG64ZD 33.1 01/31/2024    TSH 1.427 01/31/2024    PLFVFW9ZXYH 0.89 01/31/2024        Lab Results   Component Value Date    HGBA1C 5.8 01/31/2024        Hematology:  Lab Results   Component Value Date    WBC 6.31 01/31/2024    HGB 12.6 01/31/2024    HCT 40.2 01/31/2024     01/31/2024       Lipid Panel:  Lab Results   Component Value Date    CHOL 207 (H) 01/31/2024    HDL 63 (H) 01/31/2024    .00 01/31/2024    TRIG 120 01/31/2024    TOTALCHOLEST 3 01/31/2024        Urine:  Lab Results   Component Value Date    APPEARANCEUA Turbid (A) 01/31/2024    SGUA 1.031 (H) 01/31/2024    PROTEINUA 1+ (A) 01/31/2024    KETONESUA Negative 01/31/2024    LEUKOCYTESUR " official radiologist interpretation    Procedures  No procedures today  Medical, Surgical, Family, and Social History  The patient's medical history, family history, and social history, were reviewed and updated as appropriate  History reviewed  No pertinent past medical history  Past Surgical History:   Procedure Laterality Date    ANTERIOR CRUCIATE LIGAMENT REPAIR         Family History   Problem Relation Age of Onset    No Known Problems Mother     No Known Problems Father        Social History     Occupational History    Occupation:     Tobacco Use    Smoking status: Never Smoker    Smokeless tobacco: Never Used   Substance and Sexual Activity    Alcohol use: Not on file     Comment: No alcohol use - As per [employer]     Drug use: Not on file     Comment: No drug use - As per [employer]     Sexual activity: Not on file       No Known Allergies    No current outpatient medications on file        Ramin Flor PA-C 25 (A) 01/31/2024    RBCUA 0-5 01/31/2024    WBCUA 0-5 01/31/2024    BACTERIA Trace (A) 01/31/2024    SQEPUA Many (A) 01/31/2024    HYALINECASTS 0-2 (A) 01/31/2024    CREATRANDUR 154.5 (H) 08/29/2024        Assessment:          ICD-10-CM ICD-9-CM   1. Anxiety  F41.9 300.00   2. Encounter for wellness examination  Z00.00 V70.0   3. Type 2 diabetes mellitus with diabetic polyneuropathy, without long-term current use of insulin  E11.42 250.60     357.2   4. Diabetic polyneuropathy associated with type 2 diabetes mellitus  E11.42 250.60     357.2   5. Carpal tunnel syndrome, bilateral  G56.03 354.0   6. Insomnia due to other mental disorder  F51.05 300.9    F99 327.02   7. BMI 33.0-33.9,adult  Z68.33 V85.33   8. Chronic right shoulder pain  M25.511 719.41    G89.29 338.29   9. Vitamin D deficiency  E55.9 268.9   10. Primary hypertension  I10 401.9   11. Mixed hyperlipidemia  E78.2 272.2          Plan:     1. Anxiety  Assessment & Plan:  Wean off of lexapro 20mg daily. Schedule given.   Rx lexapro 5 mg po daily to take when weaning.  Will start Cymbalta 30 mg po daily once she weans off of the lexapro. Set to start in March.    Obtain counseling resources, list given for patient to find.    Practice deep breathing or abdominal breathing exercises when anxiety occurs.  Exercise daily. Get sunlight daily.  Avoid caffeine, alcohol and stimulants.  Practice positive phrases and repeat throughout the day, yoga, lavender scents or Chamomile tea will help anxiety.  Set healthy boundaries, avoid people and conversations that increase stress.  Reports any symptoms of suicidal or homicidal ideations immediately, if clinic is closed go to nearest emergency room.        Orders:  -     EScitalopram oxalate (LEXAPRO) 5 MG Tab; Take 1 tablet (5 mg total) by mouth once daily.  Dispense: 90 tablet; Refill: 3  -     DULoxetine (CYMBALTA) 30 MG capsule; Take 1 capsule (30 mg total) by mouth once daily.  Dispense: 30 capsule; Refill:  3    2. Encounter for wellness examination  -     CBC Auto Differential  -     Comprehensive Metabolic Panel  -     Urinalysis  -     Hemoglobin A1C  -     TSH  -     T4, Free  -     Vitamin D  -     Vitamin B12  -     Hepatitis Panel, Acute  -     HIV 1/2 Ag/Ab (4th Gen)  -     SYPHILIS ANTIBODY (WITH REFLEX RPR)  -     Lipid Panel    3. Type 2 diabetes mellitus with diabetic polyneuropathy, without long-term current use of insulin  Overview:  Lab Results   Component Value Date    HGBA1C 5.8 01/31/2024     Last foot exam: 2/7/25  Last eye exam: 2/7/25  Last micro albumin: 8/2024    Assessment & Plan:  Metformin to continue  Encouraged ACE/ARB/Statin according to guidelines.  Follow ADA Diet. Avoid soda, simple sweets, and limit rice/pasta/breads/starches.  Maintain healthy weight with goal BMI <30. Exercise 5 times per week for 30 minutes per day.  Stressed importance of daily foot exams.  Stressed importance of annual dilated eye exam.          4. Diabetic polyneuropathy associated with type 2 diabetes mellitus  Overview:        Assessment & Plan:  Pt has routine FU with Podiatry in [place]     We did discuss today that this is neuropathy in feet just like hands and that she is on the right medication for the type of pain she is experiencing.        5. Carpal tunnel syndrome, bilateral  Overview:  Formatting of this note might be different from the original.  Added automatically from request for surgery [id number]    Assessment & Plan:  Bilateral wrist splints not helping. Pain in right hand continues. Pain in left hand continues. Pt has seen orthopedics for CTS and had a CSI in left hand last visit which was in May. She had no documented FU appointment. She was instructed to call that clinic today to make an appointment for evaluation.  She has had surgery on right for CTS in past.      Pt is on Diclofenac already and we increased her Gabapentin last visit. Rx sent for refills of Diclofenac today. Instructed  Tylenol Arthritis TID today also. Robaxin sent for muscle spasms, right and left shoulder pain and back pain.      6. Insomnia due to other mental disorder  Assessment & Plan:  Wean Lexapro down, I gave her a paper listing how to do that.   Lexapro 5 mg po daily sent to pharmacy for her to use when weaning.  Avoid caffeine, alcohol and stimulants. Do not use illicit drugs.  Practice positive phrases and repeat throughout the day.  Try yoga, lavender scents or Chamomile tea to promote relaxation.  Set healthy boundaries, avoid people and conversations that increase stress.  Avoid caffeinated beverages after lunch  Avoid alcohol near bedtime (eg, late afternoon and evening)  Avoid smoking or other nicotine intake, particularly during the evening  Exercise regularly for at least 20 minutes, preferably more than four to five hours prior to bedtime  Avoid daytime naps, especially if they are longer than 20 to 30 minutes or occur late in the day  Resolve concerns or worries before bedtime  Try not to force sleep    Sleep Hygiene Techniques: Sleep hygiene refers to actions that tend to improve and maintain good sleep  Power down electronic devices at least one hour prior to bedtime.  Keep room dark; use eye mask or relaxation sound machine to promote rest.  Sleep as long as necessary to feel rested (usually seven to eight hours for adults) and then get out of bed  Maintain a regular sleep schedule, particularly a regular wake-up time in the morning        7. BMI 33.0-33.9,adult  Overview:  Wt Readings from Last 3 Encounters:   02/07/25 0812 82.1 kg (181 lb)   12/06/24 1021 80.1 kg (176 lb 9.6 oz)   11/22/24 0833 82.1 kg (181 lb)         Assessment & Plan:  Stable  BMI Body mass index is 33.11 kg/m².   Goal BMI <30.  Exercise 5 times a week for 30 minutes per day.  Avoid soda, simple sugars, excessive rice, potatoes or bread. Limit fast foods and fried foods.  Choose complex carbs in moderation (example: green vegetables,  beans, oatmeal). Eat plenty of fresh fruits and vegetables with lean meats daily.  Do not skip meals. Eat a balanced portion size.  Avoid fad diets. Consider permanent healthy life style changes.           8. Chronic right shoulder pain  Assessment & Plan:  Diclofenac.    Refer to Ortho for continued shoulder pain. Referred in January, unable to contact her.        9. Vitamin D deficiency  Assessment & Plan:  Vitamin D in January was WNL        10. Primary hypertension  Overview:  BP Readings from Last 3 Encounters:   02/07/25 122/82   12/06/24 (!) 140/88   11/22/24 133/85         Assessment & Plan:  Stable on meds, chronic problem  Low Sodium Diet (Dash Diet - less than 2 grams of sodium per day).  Monitor Blood Pressure daily and log. Report any consistent numbers greater than 140/90.  Smoking Cessation encouraged to aid in BP reduction.  Maintain healthy weight with goal BMI <30. Exercise 30 minutes per day 5 days per week        11. Mixed hyperlipidemia  Overview:  Lab Results   Component Value Date    CHOL 207 (H) 01/31/2024    CHOL 186 05/02/2023     Lab Results   Component Value Date    HDL 63 (H) 01/31/2024    HDL 66 (H) 05/02/2023     No results found for: LDLCALC  No results found for: DLDL  Lab Results   Component Value Date    TRIG 120 01/31/2024    TRIG 103 05/02/2023       f1 No results found for: CHOLHDL      Assessment & Plan:  Chronic, stable problem.   Stressed importance of dietary modifications. Follow a low cholesterol, low saturated fat diet with less that 200mg of cholesterol a day.  Avoid fried foods and high saturated fats (high saturated fats less than 7% of calories).  Add Flax Seed/Fish Oil supplements to diet. Increase dietary fiber.  Regular exercise can reduce LDL and raise HDL. Stressed importance of physical activity 5 times per week for 30 minutes per day.     \             Follow up in about 3 months (around 5/7/2025) for Med assessment on Cymbalta..  In addition to their scheduled  follow up, the patient has also been instructed to follow up on as needed basis.       No future appointments.     CRISTINO Diaz

## 2025-03-17 ENCOUNTER — OFFICE VISIT (OUTPATIENT)
Dept: FAMILY MEDICINE CLINIC | Facility: CLINIC | Age: 40
End: 2025-03-17
Payer: COMMERCIAL

## 2025-03-17 VITALS
WEIGHT: 180.8 LBS | OXYGEN SATURATION: 97 % | HEART RATE: 73 BPM | DIASTOLIC BLOOD PRESSURE: 74 MMHG | HEIGHT: 72 IN | SYSTOLIC BLOOD PRESSURE: 124 MMHG | BODY MASS INDEX: 24.49 KG/M2

## 2025-03-17 DIAGNOSIS — Z80.42 FAMILY HISTORY OF PROSTATE CANCER: ICD-10-CM

## 2025-03-17 DIAGNOSIS — R10.9 ABDOMINAL PAIN, UNSPECIFIED ABDOMINAL LOCATION: ICD-10-CM

## 2025-03-17 DIAGNOSIS — Z13.220 LIPID SCREENING: Primary | ICD-10-CM

## 2025-03-17 PROBLEM — M77.10 LATERAL EPICONDYLITIS: Status: RESOLVED | Noted: 2022-07-06 | Resolved: 2025-03-17

## 2025-03-17 PROBLEM — M65.4 DE QUERVAIN'S TENOSYNOVITIS, RIGHT: Status: RESOLVED | Noted: 2022-07-06 | Resolved: 2025-03-17

## 2025-03-17 PROBLEM — M54.16 LUMBAR RADICULOPATHY: Status: RESOLVED | Noted: 2022-07-06 | Resolved: 2025-03-17

## 2025-03-17 PROBLEM — S83.509A SPRAIN OF CRUCIATE LIGAMENT OF KNEE: Status: RESOLVED | Noted: 2019-05-02 | Resolved: 2025-03-17

## 2025-03-17 PROBLEM — S33.5XXA LUMBAR BACK SPRAIN: Status: RESOLVED | Noted: 2019-01-08 | Resolved: 2025-03-17

## 2025-03-17 PROBLEM — K59.00 CONSTIPATION: Status: RESOLVED | Noted: 2024-08-26 | Resolved: 2025-03-17

## 2025-03-17 PROBLEM — Z90.49 S/P APPENDECTOMY: Status: RESOLVED | Noted: 2024-08-26 | Resolved: 2025-03-17

## 2025-03-17 PROCEDURE — 99213 OFFICE O/P EST LOW 20 MIN: CPT | Performed by: PHYSICIAN ASSISTANT

## 2025-03-17 RX ORDER — CETIRIZINE HYDROCHLORIDE 10 MG/1
10 TABLET, CHEWABLE ORAL DAILY PRN
COMMUNITY

## 2025-03-17 RX ORDER — MECLIZINE HYDROCHLORIDE 25 MG/1
25 TABLET ORAL 3 TIMES DAILY PRN
COMMUNITY
Start: 2024-12-13 | End: 2025-03-17

## 2025-03-17 NOTE — PROGRESS NOTES
Name: Manfred Wolfe      : 1985      MRN: [id number]  Encounter Provider: Macy Reveles PA-C  Encounter Date: 3/17/2025   Encounter department: [facility]  :  Assessment & Plan  Lipid screening  First lipid panel baseline ordered today call with results.  Recommend physical in 1 year.  Also recommend eye exam  As it appears patient may need distance classes.  Orders:  •  Lipid panel    Abdominal pain, unspecified abdominal location  Patient is status post laparoscopic cholecystectomy with complication of cellulitis and subcu abscess.  This has for the most part resolved and reabsorbed and patient is currently no longer symptomatic and no signs of hernia.  Constipation has resolved secondary to opioid use secondary to pain from this procedure.  Overall patient doing very well.  Recommend colonoscopy age 45.        Family history of prostate cancer  Patient should start having SHE's and PSAs age 45.             Depression Screening and Follow-up Plan: Patient was screened for depression during today's encounter. They screened negative with a PHQ-2 score of 0.        History of Present Illness     Manfred Wolfe is here for chronic conditions f/u. Pt. had labs done prior to today's visit which included No results found for this or any previous visit (from the past 4 weeks).  Patient presents with:  Follow-up: 6 month fu            Review of Systems   Constitutional: Negative.    HENT: Negative.     Eyes: Negative.    Respiratory: Negative.     Cardiovascular: Negative.    Gastrointestinal: Negative.    Endocrine: Negative.    Genitourinary: Negative.    Musculoskeletal: Negative.    Skin: Negative.    Allergic/Immunologic: Negative.    Neurological: Negative.    Hematological: Negative.    Psychiatric/Behavioral: Negative.         Objective   /74 (BP Location: Left arm, Patient Position: Sitting, Cuff Size: Standard)   Pulse 73   Ht 6' (1.829 m)   Wt 82 kg (180 lb  12.8 oz)   SpO2 97%   BMI 24.52 kg/m²      Physical Exam  Vitals and nursing note reviewed.   Constitutional:       Appearance: Normal appearance.   HENT:      Head: Normocephalic and atraumatic.   Eyes:      General: Lids are normal.      Conjunctiva/sclera: Conjunctivae normal.      Pupils: Pupils are equal, round, and reactive to light.   Cardiovascular:      Rate and Rhythm: Normal rate and regular rhythm.      Heart sounds: Normal heart sounds.   Pulmonary:      Effort: Pulmonary effort is normal.      Breath sounds: Normal breath sounds.   Skin:     General: Skin is warm and dry.   Neurological:      General: No focal deficit present.      Mental Status: He is alert. Mental status is at baseline.   Psychiatric:         Mood and Affect: Mood normal.         Behavior: Behavior normal.         Thought Content: Thought content normal.         Judgment: Judgment normal.

## 2025-03-17 NOTE — PATIENT INSTRUCTIONS
1. Lipid screening  -     Lipid panel  2. Abdominal pain, unspecified abdominal location  3. Family history of prostate cancer

## 2025-08-06 ENCOUNTER — OFFICE VISIT (OUTPATIENT)
Dept: FAMILY MEDICINE CLINIC | Facility: CLINIC | Age: 40
End: 2025-08-06
Payer: COMMERCIAL

## 2025-08-06 VITALS
RESPIRATION RATE: 18 BRPM | TEMPERATURE: 98.7 F | BODY MASS INDEX: 24.79 KG/M2 | HEART RATE: 76 BPM | WEIGHT: 183 LBS | OXYGEN SATURATION: 97 % | HEIGHT: 72 IN | DIASTOLIC BLOOD PRESSURE: 68 MMHG | SYSTOLIC BLOOD PRESSURE: 118 MMHG

## 2025-08-06 DIAGNOSIS — H65.02 NON-RECURRENT ACUTE SEROUS OTITIS MEDIA OF LEFT EAR: Primary | ICD-10-CM

## 2025-08-06 DIAGNOSIS — J30.9 ALLERGIC RHINITIS, UNSPECIFIED SEASONALITY, UNSPECIFIED TRIGGER: ICD-10-CM

## 2025-08-06 PROCEDURE — 99214 OFFICE O/P EST MOD 30 MIN: CPT | Performed by: NURSE PRACTITIONER

## 2025-08-06 RX ORDER — AMOXICILLIN 500 MG/1
500 CAPSULE ORAL EVERY 12 HOURS SCHEDULED
Qty: 14 CAPSULE | Refills: 0 | Status: SHIPPED | OUTPATIENT
Start: 2025-08-06 | End: 2025-08-13

## 2025-08-20 ENCOUNTER — OFFICE VISIT (OUTPATIENT)
Dept: FAMILY MEDICINE CLINIC | Facility: CLINIC | Age: 40
End: 2025-08-20
Payer: COMMERCIAL

## 2025-08-20 VITALS
SYSTOLIC BLOOD PRESSURE: 104 MMHG | OXYGEN SATURATION: 98 % | TEMPERATURE: 96 F | HEART RATE: 72 BPM | WEIGHT: 185 LBS | HEIGHT: 72 IN | BODY MASS INDEX: 25.06 KG/M2 | DIASTOLIC BLOOD PRESSURE: 64 MMHG

## 2025-08-20 DIAGNOSIS — H69.92 DYSFUNCTION OF LEFT EUSTACHIAN TUBE: Primary | ICD-10-CM

## 2025-08-20 PROCEDURE — 99213 OFFICE O/P EST LOW 20 MIN: CPT | Performed by: PHYSICIAN ASSISTANT
